# Patient Record
Sex: FEMALE | Race: BLACK OR AFRICAN AMERICAN | NOT HISPANIC OR LATINO
[De-identification: names, ages, dates, MRNs, and addresses within clinical notes are randomized per-mention and may not be internally consistent; named-entity substitution may affect disease eponyms.]

---

## 2017-01-17 ENCOUNTER — APPOINTMENT (OUTPATIENT)
Dept: OPHTHALMOLOGY | Facility: CLINIC | Age: 62
End: 2017-01-17

## 2017-03-17 ENCOUNTER — APPOINTMENT (OUTPATIENT)
Dept: OPHTHALMOLOGY | Facility: CLINIC | Age: 62
End: 2017-03-17

## 2017-06-06 ENCOUNTER — APPOINTMENT (OUTPATIENT)
Dept: OPHTHALMOLOGY | Facility: CLINIC | Age: 62
End: 2017-06-06

## 2017-06-06 DIAGNOSIS — H26.493 OTHER SECONDARY CATARACT, BILATERAL: ICD-10-CM

## 2017-08-18 ENCOUNTER — APPOINTMENT (OUTPATIENT)
Dept: OPHTHALMOLOGY | Facility: CLINIC | Age: 62
End: 2017-08-18
Payer: COMMERCIAL

## 2017-08-18 PROCEDURE — 92012 INTRM OPH EXAM EST PATIENT: CPT

## 2017-09-18 ENCOUNTER — APPOINTMENT (OUTPATIENT)
Dept: OPHTHALMOLOGY | Facility: CLINIC | Age: 62
End: 2017-09-18
Payer: COMMERCIAL

## 2017-09-18 DIAGNOSIS — H43.392 OTHER VITREOUS OPACITIES, LEFT EYE: ICD-10-CM

## 2017-09-18 DIAGNOSIS — H59.022: ICD-10-CM

## 2017-09-18 PROCEDURE — 92014 COMPRE OPH EXAM EST PT 1/>: CPT

## 2017-09-18 PROCEDURE — 92020 GONIOSCOPY: CPT

## 2017-09-18 PROCEDURE — 92134 CPTRZ OPH DX IMG PST SGM RTA: CPT

## 2017-12-04 ENCOUNTER — APPOINTMENT (OUTPATIENT)
Dept: OPHTHALMOLOGY | Facility: CLINIC | Age: 62
End: 2017-12-04
Payer: COMMERCIAL

## 2017-12-04 DIAGNOSIS — H20.022 RECURRENT ACUTE IRIDOCYCLITIS, LEFT EYE: ICD-10-CM

## 2017-12-04 PROCEDURE — 92012 INTRM OPH EXAM EST PATIENT: CPT

## 2018-12-11 ENCOUNTER — EMERGENCY (EMERGENCY)
Facility: HOSPITAL | Age: 63
LOS: 1 days | Discharge: HOME | End: 2018-12-11
Attending: EMERGENCY MEDICINE
Payer: COMMERCIAL

## 2018-12-11 VITALS
DIASTOLIC BLOOD PRESSURE: 85 MMHG | SYSTOLIC BLOOD PRESSURE: 159 MMHG | TEMPERATURE: 98 F | RESPIRATION RATE: 18 BRPM | OXYGEN SATURATION: 98 % | HEART RATE: 79 BPM | WEIGHT: 156.97 LBS

## 2018-12-11 VITALS
HEART RATE: 70 BPM | SYSTOLIC BLOOD PRESSURE: 144 MMHG | OXYGEN SATURATION: 99 % | DIASTOLIC BLOOD PRESSURE: 74 MMHG | RESPIRATION RATE: 16 BRPM

## 2018-12-11 DIAGNOSIS — Z98.89 OTHER SPECIFIED POSTPROCEDURAL STATES: Chronic | ICD-10-CM

## 2018-12-11 DIAGNOSIS — Z98.41 CATARACT EXTRACTION STATUS, RIGHT EYE: Chronic | ICD-10-CM

## 2018-12-11 DIAGNOSIS — Z98.42 CATARACT EXTRACTION STATUS, LEFT EYE: Chronic | ICD-10-CM

## 2018-12-11 PROCEDURE — 99283 EMERGENCY DEPT VISIT LOW MDM: CPT

## 2018-12-11 PROCEDURE — 99283 EMERGENCY DEPT VISIT LOW MDM: CPT | Mod: 25

## 2018-12-11 RX ORDER — IBUPROFEN 200 MG
600 TABLET ORAL ONCE
Qty: 0 | Refills: 0 | Status: COMPLETED | OUTPATIENT
Start: 2018-12-11 | End: 2018-12-11

## 2018-12-11 RX ORDER — ACETAMINOPHEN 500 MG
650 TABLET ORAL ONCE
Qty: 0 | Refills: 0 | Status: COMPLETED | OUTPATIENT
Start: 2018-12-11 | End: 2018-12-11

## 2018-12-11 RX ADMIN — Medication 600 MILLIGRAM(S): at 01:48

## 2018-12-11 RX ADMIN — Medication 650 MILLIGRAM(S): at 01:48

## 2018-12-11 NOTE — ED PROVIDER NOTE - OBJECTIVE STATEMENT
63F PMH migraine last many years ago p/w left sided scalp pain radiating into her neck which has been ongoing for 2 days. No known trauma to area, 63F PMH migraine last many years ago p/w left sided burning scalp pain radiating over to the right side of her scalp and into her neck which has been ongoing for 2 days. No known trauma to area. Denies fevers, chills, nausea, vomiting, chest pain, SOB. Pt has had migraines in the past but this is different from prior headaches.

## 2018-12-11 NOTE — ED PROVIDER NOTE - PMH
Cataract  both eyes, being monitored by ophthalmologist.  GERD (gastroesophageal reflux disease)    Pain  epigastric, current problem  Syncopal episodes  once in 1/2014, pt was evaluated by neuro and cardiac, negative finding, no recurence.  Vertigo

## 2018-12-11 NOTE — ED PROVIDER NOTE - NSFOLLOWUPINSTRUCTIONS_ED_ALL_ED_FT
Follow up with your primary care doctor within the next 48 hours.  Return to the Emergency Department if you have any fevers, nausea, vomiting, or any other concerning symptoms.  Take tylenol 650mg every 8 hours and ibuprofen 600mg every 6 hours as needed for pain.

## 2018-12-11 NOTE — ED PROVIDER NOTE - FAMILY HISTORY
Mother  Still living? Unknown  Family history of diabetes mellitus type II, Age at diagnosis: Age Unknown

## 2018-12-11 NOTE — ED ADULT TRIAGE NOTE - CHIEF COMPLAINT QUOTE
Patient c/o pain (burning sensation) on the back of her head mostly towards the left side. Symptoms started 3 days ago. Patient also c/o nausea.

## 2018-12-11 NOTE — ED PROVIDER NOTE - PSH
Cataract extraction status of eye, right    S/P cataract surgery, left    S/P  section  in   Status post arthroscopic surgery of left knee

## 2018-12-11 NOTE — ED PROVIDER NOTE - NSFOLLOWUPCLINICS_GEN_ALL_ED_FT
[facility]  Neurology  [address] [place] [address] [address] [place] [address]  Phone: [phone]  Fax:   Follow Up Time:

## 2018-12-11 NOTE — ED PROVIDER NOTE - ATTENDING CONTRIBUTION TO CARE
Patient with left sided burning scalp pain without rash. Mild to moderate pain. Worse with touching and movement. No n/v/f/c/cp/sob. Pain radiating to the neck. Patient with history of migraines but burning sensation is different from typical headaches  no rash on exam, no lesions to scalp  cooperative  CN 2-12 intact, normal coordination, normal finger to nose, normal heel to shin, negative Romberg, no pronator drift, no gait instability, 5/5 strength in upper and lower extremities, normal sensory throughout, alert and oriented to person, place, time, and situation.  pleasant  non-tachycardic, non-tachypneic   well appearing  trachea midline  mmm  no midline cervical tenderness to palpation, neck is supple  concern for atypical migraine with tension like component into the neck  will get analgesia reassure, and have patient  follow up with neurology as outpatient.  The patient was re-examined after interventions and is feeling much better.  The patient will follow up with their primary physician this week.  No immediate life threatening issues present on history or clinical exam. Patient is a safe disposition home, has capacity and insight into their condition, is ambulatory in the Emergency Department with no further questions and will follow up with their doctor(s) this week. Patient and family  understand anticipatory guidance were given strict return and follow up precautions.  The patient and family have been informed of all concerning signs and symptoms to return to Emergency Department, the necessity to follow up with the PMD/Clinic/follow up provided within 2-3 days was explained, and the patient and/or family reports understanding of above with capacity and insight.

## 2018-12-11 NOTE — ED ADULT NURSE NOTE - OBJECTIVE STATEMENT
Pt is a 63YOF hx of vertigo (supposed to take Meclizine 12.5mg daily, last took 3 days ago) received ambulatory A&Ox4 complaining of "burning left sided head pain" x3 days. 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 Pt states the pain starts on her left occipital lobe and travels midline and down her neck. Pt also complains of nausea, no vomiting. No HA, dizziness, chest pain, SOB, fever, chills. neuro intact. Face is symmetrical. PERRL 3mmB. Speech is clear. Patient is moving all extremities with 5/5 strength and walks with steady gait. Took 5oomg x2 Tylenol with some relief today at 2pm.

## 2018-12-27 ENCOUNTER — APPOINTMENT (OUTPATIENT)
Dept: OPHTHALMOLOGY | Facility: CLINIC | Age: 63
End: 2018-12-27

## 2021-08-15 ENCOUNTER — INPATIENT (INPATIENT)
Facility: HOSPITAL | Age: 66
LOS: 4 days | Discharge: HOME | DRG: 177 | End: 2021-08-20
Attending: INTERNAL MEDICINE | Admitting: STUDENT IN AN ORGANIZED HEALTH CARE EDUCATION/TRAINING PROGRAM
Payer: MEDICARE

## 2021-08-15 VITALS
DIASTOLIC BLOOD PRESSURE: 70 MMHG | OXYGEN SATURATION: 88 % | WEIGHT: 153 LBS | HEIGHT: 63 IN | HEART RATE: 104 BPM | SYSTOLIC BLOOD PRESSURE: 104 MMHG | RESPIRATION RATE: 18 BRPM | TEMPERATURE: 101 F

## 2021-08-15 DIAGNOSIS — Z98.41 CATARACT EXTRACTION STATUS, RIGHT EYE: Chronic | ICD-10-CM

## 2021-08-15 DIAGNOSIS — Z98.89 OTHER SPECIFIED POSTPROCEDURAL STATES: Chronic | ICD-10-CM

## 2021-08-15 DIAGNOSIS — Z98.42 CATARACT EXTRACTION STATUS, LEFT EYE: Chronic | ICD-10-CM

## 2021-08-16 DIAGNOSIS — R42 DIZZINESS AND GIDDINESS: ICD-10-CM

## 2021-08-16 LAB
ALBUMIN SERPL ELPH-MCNC: 3.8 G/DL — SIGNIFICANT CHANGE UP (ref 3.3–5)
ALBUMIN SERPL ELPH-MCNC: 3.9 G/DL — SIGNIFICANT CHANGE UP (ref 3.3–5)
ALP SERPL-CCNC: 59 U/L — SIGNIFICANT CHANGE UP (ref 40–120)
ALP SERPL-CCNC: 60 U/L — SIGNIFICANT CHANGE UP (ref 40–120)
ALT FLD-CCNC: 29 U/L — SIGNIFICANT CHANGE UP (ref 10–45)
ALT FLD-CCNC: 29 U/L — SIGNIFICANT CHANGE UP (ref 10–45)
ANION GAP SERPL CALC-SCNC: 10 MMOL/L — SIGNIFICANT CHANGE UP (ref 5–17)
AST SERPL-CCNC: 49 U/L — HIGH (ref 10–40)
AST SERPL-CCNC: 57 U/L — HIGH (ref 10–40)
BASOPHILS # BLD AUTO: 0 K/UL — SIGNIFICANT CHANGE UP (ref 0–0.2)
BASOPHILS NFR BLD AUTO: 0 % — SIGNIFICANT CHANGE UP (ref 0–2)
BILIRUB DIRECT SERPL-MCNC: 0.2 MG/DL — SIGNIFICANT CHANGE UP (ref 0–0.2)
BILIRUB INDIRECT FLD-MCNC: 0.3 MG/DL — SIGNIFICANT CHANGE UP (ref 0.2–1)
BILIRUB SERPL-MCNC: 0.5 MG/DL — SIGNIFICANT CHANGE UP (ref 0.2–1.2)
BILIRUB SERPL-MCNC: 0.5 MG/DL — SIGNIFICANT CHANGE UP (ref 0.2–1.2)
BUN SERPL-MCNC: 8 MG/DL — SIGNIFICANT CHANGE UP (ref 7–23)
CALCIUM SERPL-MCNC: 9.1 MG/DL — SIGNIFICANT CHANGE UP (ref 8.4–10.5)
CHLORIDE SERPL-SCNC: 96 MMOL/L — SIGNIFICANT CHANGE UP (ref 96–108)
CO2 SERPL-SCNC: 27 MMOL/L — SIGNIFICANT CHANGE UP (ref 22–31)
CREAT SERPL-MCNC: 0.94 MG/DL — SIGNIFICANT CHANGE UP (ref 0.5–1.3)
CREAT SERPL-MCNC: 1.15 MG/DL — SIGNIFICANT CHANGE UP (ref 0.5–1.3)
CRP SERPL-MCNC: 18 MG/L — HIGH (ref 0–4)
D DIMER BLD IA.RAPID-MCNC: 592 NG/ML DDU — HIGH
EOSINOPHIL # BLD AUTO: 0 K/UL — SIGNIFICANT CHANGE UP (ref 0–0.5)
EOSINOPHIL NFR BLD AUTO: 0 % — SIGNIFICANT CHANGE UP (ref 0–6)
FERRITIN SERPL-MCNC: 2319 NG/ML — HIGH (ref 15–150)
GLUCOSE SERPL-MCNC: 105 MG/DL — HIGH (ref 70–99)
HCT VFR BLD CALC: 42.9 % — SIGNIFICANT CHANGE UP (ref 34.5–45)
HGB BLD-MCNC: 13.8 G/DL — SIGNIFICANT CHANGE UP (ref 11.5–15.5)
INR BLD: 0.94 RATIO — SIGNIFICANT CHANGE UP (ref 0.88–1.16)
LACTATE BLDV-MCNC: 1.5 MMOL/L — SIGNIFICANT CHANGE UP (ref 0.7–2)
LYMPHOCYTES # BLD AUTO: 0.85 K/UL — LOW (ref 1–3.3)
LYMPHOCYTES # BLD AUTO: 18.4 % — SIGNIFICANT CHANGE UP (ref 13–44)
MCHC RBC-ENTMCNC: 27.5 PG — SIGNIFICANT CHANGE UP (ref 27–34)
MCHC RBC-ENTMCNC: 32.2 GM/DL — SIGNIFICANT CHANGE UP (ref 32–36)
MCV RBC AUTO: 85.6 FL — SIGNIFICANT CHANGE UP (ref 80–100)
MONOCYTES # BLD AUTO: 0.61 K/UL — SIGNIFICANT CHANGE UP (ref 0–0.9)
MONOCYTES NFR BLD AUTO: 13.2 % — SIGNIFICANT CHANGE UP (ref 2–14)
NEUTROPHILS # BLD AUTO: 2.99 K/UL — SIGNIFICANT CHANGE UP (ref 1.8–7.4)
NEUTROPHILS NFR BLD AUTO: 56.1 % — SIGNIFICANT CHANGE UP (ref 43–77)
PLATELET # BLD AUTO: 134 K/UL — LOW (ref 150–400)
POTASSIUM SERPL-MCNC: 4.4 MMOL/L — SIGNIFICANT CHANGE UP (ref 3.5–5.3)
POTASSIUM SERPL-SCNC: 4.4 MMOL/L — SIGNIFICANT CHANGE UP (ref 3.5–5.3)
PROCALCITONIN SERPL-MCNC: 0.08 NG/ML — SIGNIFICANT CHANGE UP (ref 0.02–0.1)
PROT SERPL-MCNC: 7.3 G/DL — SIGNIFICANT CHANGE UP (ref 6–8.3)
PROT SERPL-MCNC: 7.4 G/DL — SIGNIFICANT CHANGE UP (ref 6–8.3)
PROTHROM AB SERPL-ACNC: 11.3 SEC — SIGNIFICANT CHANGE UP (ref 10.6–13.6)
RAPID RVP RESULT: DETECTED
RBC # BLD: 5.01 M/UL — SIGNIFICANT CHANGE UP (ref 3.8–5.2)
RBC # FLD: 12.3 % — SIGNIFICANT CHANGE UP (ref 10.3–14.5)
SARS-COV-2 RNA SPEC QL NAA+PROBE: DETECTED
SODIUM SERPL-SCNC: 133 MMOL/L — LOW (ref 135–145)
WBC # BLD: 4.6 K/UL — SIGNIFICANT CHANGE UP (ref 3.8–10.5)
WBC # FLD AUTO: 4.6 K/UL — SIGNIFICANT CHANGE UP (ref 3.8–10.5)

## 2021-08-16 PROCEDURE — 93010 ELECTROCARDIOGRAM REPORT: CPT

## 2021-08-16 PROCEDURE — 99222 1ST HOSP IP/OBS MODERATE 55: CPT

## 2021-08-16 PROCEDURE — 71045 X-RAY EXAM CHEST 1 VIEW: CPT | Mod: 26

## 2021-08-16 RX ORDER — REMDESIVIR 5 MG/ML
INJECTION INTRAVENOUS
Refills: 0 | Status: COMPLETED | OUTPATIENT
Start: 2021-08-16 | End: 2021-08-20

## 2021-08-16 RX ORDER — IBUPROFEN 200 MG
400 TABLET ORAL ONCE
Refills: 0 | Status: COMPLETED | OUTPATIENT
Start: 2021-08-16 | End: 2021-08-16

## 2021-08-16 RX ORDER — DEXAMETHASONE 0.5 MG/5ML
6 ELIXIR ORAL ONCE
Refills: 0 | Status: COMPLETED | OUTPATIENT
Start: 2021-08-16 | End: 2021-08-16

## 2021-08-16 RX ORDER — IBUPROFEN 200 MG
400 TABLET ORAL ONCE
Refills: 0 | Status: COMPLETED | OUTPATIENT
Start: 2021-08-16 | End: 2021-08-17

## 2021-08-16 RX ORDER — DEXAMETHASONE 0.5 MG/5ML
6 ELIXIR ORAL DAILY
Refills: 0 | Status: DISCONTINUED | OUTPATIENT
Start: 2021-08-16 | End: 2021-08-20

## 2021-08-16 RX ORDER — REMDESIVIR 5 MG/ML
100 INJECTION INTRAVENOUS EVERY 24 HOURS
Refills: 0 | Status: COMPLETED | OUTPATIENT
Start: 2021-08-17 | End: 2021-08-20

## 2021-08-16 RX ORDER — REMDESIVIR 5 MG/ML
200 INJECTION INTRAVENOUS EVERY 24 HOURS
Refills: 0 | Status: COMPLETED | OUTPATIENT
Start: 2021-08-16 | End: 2021-08-16

## 2021-08-16 RX ORDER — ENOXAPARIN SODIUM 100 MG/ML
40 INJECTION SUBCUTANEOUS DAILY
Refills: 0 | Status: DISCONTINUED | OUTPATIENT
Start: 2021-08-16 | End: 2021-08-20

## 2021-08-16 RX ADMIN — Medication 6 MILLIGRAM(S): at 01:09

## 2021-08-16 RX ADMIN — REMDESIVIR 500 MILLIGRAM(S): 5 INJECTION INTRAVENOUS at 17:45

## 2021-08-16 RX ADMIN — Medication 6 MILLIGRAM(S): at 23:35

## 2021-08-16 RX ADMIN — Medication 400 MILLIGRAM(S): at 01:08

## 2021-08-16 RX ADMIN — ENOXAPARIN SODIUM 40 MILLIGRAM(S): 100 INJECTION SUBCUTANEOUS at 12:10

## 2021-08-16 RX ADMIN — Medication 400 MILLIGRAM(S): at 01:38

## 2021-08-16 NOTE — ED PROVIDER NOTE - ATTENDING CONTRIBUTION TO CARE
Attending MD Gunter:  I personally have seen and examined this patient.  Resident note reviewed and agree on plan of care and except where noted.  See HPI, PE, and MDM for details.

## 2021-08-16 NOTE — PROVIDER CONTACT NOTE (OTHER) - ACTION/TREATMENT ORDERED:
As per ACP: obtain updated vitals and EKG, patient with similar complaint during admission, analgesics administered at that time. Pending orders. Will continue to monitor.

## 2021-08-16 NOTE — ED PROVIDER NOTE - NS ED ROS FT
CONSTITUTIONAL: No fevers, no chills, + dizziness  EYES: no visual changes, no eye pain  EARS: no ear drainage, no ear pain, no change in hearing  NOSE: no nasal congestion  MOUTH/THROAT: no sore throat  CV: No chest pain, no palpitations  RESP: No SOB, no cough  GI: +nausea, +vomiting, no diarrhea, no abd pain  : no dysuria, no hematuria, no flank pain  MSK: no back pain, no extremity pain  SKIN: no rashes  NEURO: no headache, no focal weakness, no decreased sensation/paresthesias   PSYCHIATRIC: no known mental health issues

## 2021-08-16 NOTE — ED PROVIDER NOTE - NSICDXPASTSURGICALHX_GEN_ALL_CORE_FT
PAST SURGICAL HISTORY:  Cataract extraction status of eye, right     S/P cataract surgery, left     S/P  section in     Status post arthroscopic surgery of left knee 2007

## 2021-08-16 NOTE — CONSULT NOTE ADULT - SUBJECTIVE AND OBJECTIVE BOX
HPI:  65y F w/ PMHx Vertigo on Meclizine, GERD presenting with nausea, dizziness, weakness and vomiting that started two weeks ago. Patient states her dizziness is typical of her vertigo, but has been worsening in nature and recurring more frequently in nature w/ associated nausea, vomiting and weakness. Denies headache, visual changes, focal weakness/numbness, chest pain or shortness of breath. Patient states she is not vaccinated for COVID. Patient arrived to room hypoxic to 88% on RA, but does not endorse difficulty breathing or cough. Denies recent travel. She lives with her  who is well, he is fully vaccinated. She is not vaccinated. She thinks she might have contracted COVID while going to the gym.    PAST MEDICAL & SURGICAL HISTORY:  Vertigo    GERD (gastroesophageal reflux disease)    Pain  epigastric, current problem    Cataract  both eyes, being monitored by ophthalmologist.    Syncopal episodes  once in 2014, pt was evaluated by neuro and cardiac, negative finding, no recurence.    S/P  section  in     Status post arthroscopic surgery of left knee      Cataract extraction status of eye, right    S/P cataract surgery, left    Allergies    multivatimin b complex (Short breath)  multivitamin (Unknown)    Intolerances    ANTIMICROBIALS:  remdesivir  IVPB    remdesivir  IVPB 200 every 24 hours    OTHER MEDS:  dexAMETHasone  Injectable 6 milliGRAM(s) IV Push daily  enoxaparin Injectable 40 milliGRAM(s) SubCutaneous daily    SOCIAL HISTORY: Denies smoking, alcohol, drug use.    FAMILY HISTORY:  Family history of diabetes mellitus type II (Mother) - mother lived to 99 years and 10 months old    Drug Dosing Weight  Height (cm): 160 (15 Aug 2021 22:39)  Weight (kg): 69.4 (15 Aug 2021 22:39)  BMI (kg/m2): 27.1 (15 Aug 2021 22:39)  BSA (m2): 1.73 (15 Aug 2021 22:39)    PE:    Vital Signs Last 24 Hrs  T(C): 36.8 (16 Aug 2021 11:27), Max: 38.3 (15 Aug 2021 22:39)  T(F): 98.3 (16 Aug 2021 11:27), Max: 100.9 (15 Aug 2021 22:39)  HR: 92 (16 Aug 2021 11:27) (80 - 104)  BP: 108/73 (16 Aug 2021 11:27) (102/68 - 119/74)  BP(mean): 83 (16 Aug 2021 01:15) (83 - 83)  RR: 18 (16 Aug 2021 11:27) (18 - 20)  SpO2: 96% (16 Aug 2021 11:27) (88% - 99%)    Gen: AOx3, NAD  CV: S1+S2 normal, no murmurs  Resp: Crackles in right base  Abd: Soft, nontender, +BS  Ext: No LE edema, no wounds  : No Mallory  IV/Skin: No thrombophlebitis  Msk: No low back pain, no arthralgias, no joint swelling  Neuro: No sensory deficits, no motor deficits    LABS:                          13.8   4.60  )-----------( 134      ( 16 Aug 2021 00:03 )             42.9           x   |  x   |  x   ----------------------------<  x   x    |  x   |  0.94    Ca    9.1      16 Aug 2021 00:03    TPro  7.4  /  Alb  3.9  /  TBili  0.5  /  DBili  0.2  /  AST  49<H>  /  ALT  29  /  AlkPhos  60      MICROBIOLOGY:  v    Rapid RVP Result: Detected ( @ 00:03)    RADIOLOGY:  < from: Xray Chest 1 View- PORTABLE-Urgent (21 @ 00:27) >  IMPRESSION:  Bilateral lower lobe and peripherally predominant patchy opacities, compatible with infectious  etiology.    < end of copied text >

## 2021-08-16 NOTE — ED ADULT NURSE NOTE - OBJECTIVE STATEMENT
65y F w/ PMHx Vertigo on Meclizine, GERD presenting with nausea, dizziness, weakness and vomiting x5 days. Patient states her dizziness is typical of her vertigo, but has been worsening in nature and recurring more frequently. Pt is AOx4, patent airway, soft non tender abdomen, strong peripheral pulses, skin is warm dry and intact, no changes in bowel/bladder patterns, ambulates independently. Patient denies fever, chills, abd pain, diarrhea/constipation, numbness/tingling, urinary s/s,  no chest pain, Patient safety provided with call bell within reach and bed in the lowest position.

## 2021-08-16 NOTE — ED PROVIDER NOTE - PROGRESS NOTE DETAILS
Cristiane BURNHAM (PGY-3): Upon reassessment, patient remains on 3L NC and asymptomatic. Dizziness worsens with standing. Patient asymptomatic on stretcher.

## 2021-08-16 NOTE — ED PROVIDER NOTE - PHYSICAL EXAMINATION
Physical Exam:  Gen: NAD, AOx3, non-toxic appearing, able to ambulate without assistance  HEENT: EOMI, PEERL, normal conjunctiva, tongue midline, oral mucosa moist  Lung: CTAB, no respiratory distress, no wheezes/rhonchi/rales B/L, speaking in full sentences  CV: RRR, no murmurs, rubs or gallops  Abd: soft, NT, ND, no guarding, no rigidity, no rebound tenderness, no CVA tenderness   MSK: no visible deformities, ROM normal in UE/LE, no back pain  Neuro: CN2-12 grossly intact, MS +5/5 in UE and LE BL, finger to nose smooth and rapid, gross sensation intact in UE and LE BL, negative rhomberg, negative pronator drift  Skin: Warm, well perfused, no rash, no leg swelling  Psych: normal affect, calm  Ksenia Sauer D.O. -Resident

## 2021-08-16 NOTE — H&P ADULT - RS GEN PE MLT RESP DETAILS PC
breath sounds equal/clear to auscultation bilaterally/diminished breath sounds, L/diminished breath sounds, R/rales

## 2021-08-16 NOTE — CONSULT NOTE ADULT - ASSESSMENT
65 year old female with vertigo on meclizine, GERD presenting with nausea, dizziness, weakness, and vomiting that started about two weeks ago, patient states with worsening symptoms so came to the ED. Febrile in the .9F. WBC WNL. Ferritin 2319. PCT 0.08. COVID PCR + 8/16/2021. CXR with bilateral patchy opacities.    #COVID PNA with hypoxia  -Continue supportive care  -Continue dexamethasone and Remdesivir per hospital protocol  -Wean off supplemental O2 as tolerated  -Trend inflammatory markers  -Trend d-dimer  -Trend CrCl and LFTs daily while on Remdesivir  -Low PCT points away from a bacterial process, can monitor off abx.    Simon Jones MD  Pager [phone]  After 5pm/weekends call [phone]

## 2021-08-16 NOTE — ED PROVIDER NOTE - CLINICAL SUMMARY MEDICAL DECISION MAKING FREE TEXT BOX
Attending MD Gunter:  65F presenting with fatigue, acute on chronic dizziness (ho chronic vertigo), cough. Triage vitals notable for RA sat 88%, no distress. Non-focal neuro exam. Unvaccinated for covid-19. High suspicion for covid-19 illness, will obtain CXR, covid RVP. Vertigo chronic issue with non-focal neuro exam, no need for urgent diagnostics for this.       *The above represents an initial assessment/impression. Please refer to progress notes for potential changes in patient clinical course*

## 2021-08-16 NOTE — PROVIDER CONTACT NOTE (OTHER) - ASSESSMENT
Patient resting in bed, states that her chest feels as if its trying to jump out of her chest. She endorses non-radiating chest tightness as well.     VS to be obtained.

## 2021-08-16 NOTE — H&P ADULT - HISTORY OF PRESENT ILLNESS
65y F with pmhx vertigo on Meclizine, GERD presenting with nausea, dizziness, weakness and vomiting that started two weeks ago. Patient states her dizziness is typical of her vertigo, associated with nausea, vomiting and weakness.   Patient states she is not vaccinated for COVID.   In the ed patient tested COVID positive.

## 2021-08-16 NOTE — ED PROVIDER NOTE - NSICDXPASTMEDICALHX_GEN_ALL_CORE_FT
PAST MEDICAL HISTORY:  Cataract both eyes, being monitored by ophthalmologist.    GERD (gastroesophageal reflux disease)     Pain epigastric, current problem    Syncopal episodes once in 1/2014, pt was evaluated by neuro and cardiac, negative finding, no recurence.    Vertigo

## 2021-08-16 NOTE — ED PROVIDER NOTE - OBJECTIVE STATEMENT
65y F w/ PMHx Vertigo on Meclizine, GERD presenting with nausea, dizziness, weakness and vomiting x5 days. Patient states her dizziness is typical of her vertigo, but has been worsening in nature and recurring more frequently 65y F w/ PMHx Vertigo on Meclizine, GERD presenting with nausea, dizziness, weakness and vomiting x5 days. Patient states her dizziness is typical of her vertigo, but has been worsening in nature and recurring more frequently in nature w/ associated nausea, vomiting and weakness. Patient denies change in quality of her dizzy symptoms and denies associated headache, visual changes, focal weakness/numbness, chest pain or shortness of breath. Patient states she is not vaccinated for COVID. Patient arrived to room hypoxic to 88% on RA, but does not endorse difficulty breathing or cough. Denies recent travel.

## 2021-08-16 NOTE — ED PROVIDER NOTE - NSICDXFAMILYHX_GEN_ALL_CORE_FT
FAMILY HISTORY:  Mother  Still living? Unknown  Family history of diabetes mellitus type II, Age at diagnosis: Age Unknown

## 2021-08-17 LAB
ALBUMIN SERPL ELPH-MCNC: 3.8 G/DL — SIGNIFICANT CHANGE UP (ref 3.3–5)
ALBUMIN SERPL ELPH-MCNC: 4.4 G/DL — SIGNIFICANT CHANGE UP (ref 3.3–5)
ALP SERPL-CCNC: 59 U/L — SIGNIFICANT CHANGE UP (ref 40–120)
ALP SERPL-CCNC: 62 U/L — SIGNIFICANT CHANGE UP (ref 40–120)
ALT FLD-CCNC: 35 U/L — SIGNIFICANT CHANGE UP (ref 10–45)
ALT FLD-CCNC: 39 U/L — SIGNIFICANT CHANGE UP (ref 10–45)
ANION GAP SERPL CALC-SCNC: 14 MMOL/L — SIGNIFICANT CHANGE UP (ref 5–17)
AST SERPL-CCNC: 49 U/L — HIGH (ref 10–40)
AST SERPL-CCNC: 50 U/L — HIGH (ref 10–40)
BASOPHILS # BLD AUTO: 0 K/UL — SIGNIFICANT CHANGE UP (ref 0–0.2)
BASOPHILS NFR BLD AUTO: 0 % — SIGNIFICANT CHANGE UP (ref 0–2)
BILIRUB DIRECT SERPL-MCNC: 0.1 MG/DL — SIGNIFICANT CHANGE UP (ref 0–0.2)
BILIRUB INDIRECT FLD-MCNC: 0.4 MG/DL — SIGNIFICANT CHANGE UP (ref 0.2–1)
BILIRUB SERPL-MCNC: 0.5 MG/DL — SIGNIFICANT CHANGE UP (ref 0.2–1.2)
BILIRUB SERPL-MCNC: 0.5 MG/DL — SIGNIFICANT CHANGE UP (ref 0.2–1.2)
BUN SERPL-MCNC: 13 MG/DL — SIGNIFICANT CHANGE UP (ref 7–23)
CALCIUM SERPL-MCNC: 9.5 MG/DL — SIGNIFICANT CHANGE UP (ref 8.4–10.5)
CHLORIDE SERPL-SCNC: 102 MMOL/L — SIGNIFICANT CHANGE UP (ref 96–108)
CO2 SERPL-SCNC: 20 MMOL/L — LOW (ref 22–31)
COVID-19 SPIKE DOMAIN AB INTERP: POSITIVE
COVID-19 SPIKE DOMAIN ANTIBODY RESULT: 1.69 U/ML — HIGH
CREAT SERPL-MCNC: 0.98 MG/DL — SIGNIFICANT CHANGE UP (ref 0.5–1.3)
CREAT SERPL-MCNC: 1.01 MG/DL — SIGNIFICANT CHANGE UP (ref 0.5–1.3)
EOSINOPHIL # BLD AUTO: 0 K/UL — SIGNIFICANT CHANGE UP (ref 0–0.5)
EOSINOPHIL NFR BLD AUTO: 0 % — SIGNIFICANT CHANGE UP (ref 0–6)
GLUCOSE SERPL-MCNC: 135 MG/DL — HIGH (ref 70–99)
HCT VFR BLD CALC: 44.2 % — SIGNIFICANT CHANGE UP (ref 34.5–45)
HGB BLD-MCNC: 14.2 G/DL — SIGNIFICANT CHANGE UP (ref 11.5–15.5)
INR BLD: 0.97 RATIO — SIGNIFICANT CHANGE UP (ref 0.88–1.16)
LYMPHOCYTES # BLD AUTO: 1.04 K/UL — SIGNIFICANT CHANGE UP (ref 1–3.3)
LYMPHOCYTES # BLD AUTO: 14.3 % — SIGNIFICANT CHANGE UP (ref 13–44)
MANUAL SMEAR VERIFICATION: SIGNIFICANT CHANGE UP
MCHC RBC-ENTMCNC: 27 PG — SIGNIFICANT CHANGE UP (ref 27–34)
MCHC RBC-ENTMCNC: 32.1 GM/DL — SIGNIFICANT CHANGE UP (ref 32–36)
MCV RBC AUTO: 84.2 FL — SIGNIFICANT CHANGE UP (ref 80–100)
MONOCYTES # BLD AUTO: 0.45 K/UL — SIGNIFICANT CHANGE UP (ref 0–0.9)
MONOCYTES NFR BLD AUTO: 6.2 % — SIGNIFICANT CHANGE UP (ref 2–14)
NEUTROPHILS # BLD AUTO: 5.44 K/UL — SIGNIFICANT CHANGE UP (ref 1.8–7.4)
NEUTROPHILS NFR BLD AUTO: 74.1 % — SIGNIFICANT CHANGE UP (ref 43–77)
NEUTS BAND # BLD: 0.9 % — SIGNIFICANT CHANGE UP (ref 0–8)
PLAT MORPH BLD: NORMAL — SIGNIFICANT CHANGE UP
PLATELET # BLD AUTO: 220 K/UL — SIGNIFICANT CHANGE UP (ref 150–400)
POTASSIUM SERPL-MCNC: 5.4 MMOL/L — HIGH (ref 3.5–5.3)
POTASSIUM SERPL-SCNC: 5.4 MMOL/L — HIGH (ref 3.5–5.3)
PROT SERPL-MCNC: 7.4 G/DL — SIGNIFICANT CHANGE UP (ref 6–8.3)
PROT SERPL-MCNC: 7.9 G/DL — SIGNIFICANT CHANGE UP (ref 6–8.3)
PROTHROM AB SERPL-ACNC: 11.7 SEC — SIGNIFICANT CHANGE UP (ref 10.6–13.6)
RBC # BLD: 5.25 M/UL — HIGH (ref 3.8–5.2)
RBC # FLD: 12.3 % — SIGNIFICANT CHANGE UP (ref 10.3–14.5)
RBC BLD AUTO: SIGNIFICANT CHANGE UP
SARS-COV-2 IGG+IGM SERPL QL IA: 1.69 U/ML — HIGH
SARS-COV-2 IGG+IGM SERPL QL IA: POSITIVE
SODIUM SERPL-SCNC: 136 MMOL/L — SIGNIFICANT CHANGE UP (ref 135–145)
VARIANT LYMPHS # BLD: 4.5 % — SIGNIFICANT CHANGE UP (ref 0–6)
WBC # BLD: 7.25 K/UL — SIGNIFICANT CHANGE UP (ref 3.8–10.5)
WBC # FLD AUTO: 7.25 K/UL — SIGNIFICANT CHANGE UP (ref 3.8–10.5)

## 2021-08-17 PROCEDURE — 99232 SBSQ HOSP IP/OBS MODERATE 35: CPT

## 2021-08-17 RX ORDER — CHLORHEXIDINE GLUCONATE 213 G/1000ML
1 SOLUTION TOPICAL
Refills: 0 | Status: DISCONTINUED | OUTPATIENT
Start: 2021-08-17 | End: 2021-08-20

## 2021-08-17 RX ADMIN — CHLORHEXIDINE GLUCONATE 1 APPLICATION(S): 213 SOLUTION TOPICAL at 14:00

## 2021-08-17 RX ADMIN — Medication 400 MILLIGRAM(S): at 00:22

## 2021-08-17 RX ADMIN — ENOXAPARIN SODIUM 40 MILLIGRAM(S): 100 INJECTION SUBCUTANEOUS at 11:48

## 2021-08-17 RX ADMIN — REMDESIVIR 500 MILLIGRAM(S): 5 INJECTION INTRAVENOUS at 17:21

## 2021-08-17 RX ADMIN — Medication 400 MILLIGRAM(S): at 01:44

## 2021-08-17 RX ADMIN — Medication 6 MILLIGRAM(S): at 21:30

## 2021-08-18 ENCOUNTER — TRANSCRIPTION ENCOUNTER (OUTPATIENT)
Age: 66
End: 2021-08-18

## 2021-08-18 LAB
ALBUMIN SERPL ELPH-MCNC: 3.8 G/DL — SIGNIFICANT CHANGE UP (ref 3.3–5)
ALP SERPL-CCNC: 55 U/L — SIGNIFICANT CHANGE UP (ref 40–120)
ALT FLD-CCNC: 33 U/L — SIGNIFICANT CHANGE UP (ref 10–45)
ANION GAP SERPL CALC-SCNC: 13 MMOL/L — SIGNIFICANT CHANGE UP (ref 5–17)
AST SERPL-CCNC: 39 U/L — SIGNIFICANT CHANGE UP (ref 10–40)
BILIRUB SERPL-MCNC: 0.4 MG/DL — SIGNIFICANT CHANGE UP (ref 0.2–1.2)
BUN SERPL-MCNC: 11 MG/DL — SIGNIFICANT CHANGE UP (ref 7–23)
CALCIUM SERPL-MCNC: 9.5 MG/DL — SIGNIFICANT CHANGE UP (ref 8.4–10.5)
CHLORIDE SERPL-SCNC: 100 MMOL/L — SIGNIFICANT CHANGE UP (ref 96–108)
CO2 SERPL-SCNC: 24 MMOL/L — SIGNIFICANT CHANGE UP (ref 22–31)
CREAT SERPL-MCNC: 0.86 MG/DL — SIGNIFICANT CHANGE UP (ref 0.5–1.3)
CRP SERPL-MCNC: 6 MG/L — HIGH (ref 0–4)
D DIMER BLD IA.RAPID-MCNC: 645 NG/ML DDU — HIGH
FERRITIN SERPL-MCNC: 1343 NG/ML — HIGH (ref 15–150)
GLUCOSE SERPL-MCNC: 133 MG/DL — HIGH (ref 70–99)
HCT VFR BLD CALC: 41.9 % — SIGNIFICANT CHANGE UP (ref 34.5–45)
HGB BLD-MCNC: 13.6 G/DL — SIGNIFICANT CHANGE UP (ref 11.5–15.5)
MCHC RBC-ENTMCNC: 27.8 PG — SIGNIFICANT CHANGE UP (ref 27–34)
MCHC RBC-ENTMCNC: 32.5 GM/DL — SIGNIFICANT CHANGE UP (ref 32–36)
MCV RBC AUTO: 85.5 FL — SIGNIFICANT CHANGE UP (ref 80–100)
NRBC # BLD: 0 /100 WBCS — SIGNIFICANT CHANGE UP (ref 0–0)
PLATELET # BLD AUTO: 242 K/UL — SIGNIFICANT CHANGE UP (ref 150–400)
POTASSIUM SERPL-MCNC: 5.3 MMOL/L — SIGNIFICANT CHANGE UP (ref 3.5–5.3)
POTASSIUM SERPL-SCNC: 5.3 MMOL/L — SIGNIFICANT CHANGE UP (ref 3.5–5.3)
PROT SERPL-MCNC: 7.1 G/DL — SIGNIFICANT CHANGE UP (ref 6–8.3)
RBC # BLD: 4.9 M/UL — SIGNIFICANT CHANGE UP (ref 3.8–5.2)
RBC # FLD: 12.4 % — SIGNIFICANT CHANGE UP (ref 10.3–14.5)
SODIUM SERPL-SCNC: 137 MMOL/L — SIGNIFICANT CHANGE UP (ref 135–145)
WBC # BLD: 7.85 K/UL — SIGNIFICANT CHANGE UP (ref 3.8–10.5)
WBC # FLD AUTO: 7.85 K/UL — SIGNIFICANT CHANGE UP (ref 3.8–10.5)

## 2021-08-18 PROCEDURE — 99232 SBSQ HOSP IP/OBS MODERATE 35: CPT

## 2021-08-18 RX ADMIN — CHLORHEXIDINE GLUCONATE 1 APPLICATION(S): 213 SOLUTION TOPICAL at 05:08

## 2021-08-18 RX ADMIN — REMDESIVIR 500 MILLIGRAM(S): 5 INJECTION INTRAVENOUS at 17:29

## 2021-08-18 RX ADMIN — Medication 6 MILLIGRAM(S): at 22:02

## 2021-08-18 RX ADMIN — ENOXAPARIN SODIUM 40 MILLIGRAM(S): 100 INJECTION SUBCUTANEOUS at 12:04

## 2021-08-18 NOTE — DISCHARGE NOTE PROVIDER - NSDCMRMEDTOKEN_GEN_ALL_CORE_FT
meclizine:  orally   Percocet 5/325 oral tablet: 1 tab(s) orally every 4 hours, As needed, Moderate Pain  ranitidine 150 mg oral tablet: 1 tab(s) orally 2 times a day   meclizine:  orally   Oxygen: 2 liters nasal cannula as needed  Dx: UO7.1  Inogen setting 2  Duration: 12 months   Percocet 5/325 oral tablet: 1 tab(s) orally every 4 hours, As needed, Moderate Pain  ranitidine 150 mg oral tablet: 1 tab(s) orally 2 times a day   dexamethasone 6 mg oral tablet: 1 tab(s) orally once a day Total 10 days ( last day 8/25)   meclizine:  orally   Oxygen: 2 liters nasal cannula as needed  Dx: UO7.1  Inogen setting 2  Duration: 12 months   ranitidine 150 mg oral tablet: 1 tab(s) orally 2 times a day  sodium chloride 0.65% nasal spray: 1 spray(s) nasal 4 times a day, As Needed  Xarelto 10 mg oral tablet: 1 tab(s) orally once a day

## 2021-08-18 NOTE — DISCHARGE NOTE PROVIDER - NSDCCPCAREPLAN_GEN_ALL_CORE_FT
PRINCIPAL DISCHARGE DIAGNOSIS  Diagnosis: Pneumonia due to COVID-19 virus  Assessment and Plan of Treatment: You tested positive for COVID 19.  You no longer require hospitalization.  Please restrict activities outside of your home except for getting medical care.  Do not go to work, school, or public areas.  Avoid using public transportation, ride-sharing, or taxis.  Separate yourself from other people and animals in your home.  Call ahead before visiting your doctor.  Wear a facemask when you are around other people. Cover your cough and sneezes.  Clean your hands often.  Avoid sharing personal household items.  Clean all frequently touched surfaces daily.   Please follow up with your primaryc are physocoan in one week         SECONDARY DISCHARGE DIAGNOSES  Diagnosis: Acute respiratory failure with hypoxia  Assessment and Plan of Treatment:     Diagnosis: Dizziness  Assessment and Plan of Treatment:      PRINCIPAL DISCHARGE DIAGNOSIS  Diagnosis: Pneumonia due to COVID-19 virus  Assessment and Plan of Treatment: You tested positive for COVID 19.  You no longer require hospitalization.  Please restrict activities outside of your home except for getting medical care.  Do not go to work, school, or public areas.  Avoid using public transportation, ride-sharing, or taxis.  Separate yourself from other people and animals in your home.  Call ahead before visiting your doctor.  Wear a facemask when you are around other people. Cover your cough and sneezes.  Clean your hands often.  Avoid sharing personal household items.  Clean all frequently touched surfaces daily.   Continue Xarelto 10 mg daily for 30 days to prevent blood clot in the setting of COVID infection   Please complete 10 days of Dexamethasone 6 mg daily   Please follow up with your primray care physician in one week         SECONDARY DISCHARGE DIAGNOSES  Diagnosis: Acute respiratory failure with hypoxia  Assessment and Plan of Treatment: Still require supplemental Oxygen 2 LPM via nasal cannula  Continue with supplemental oxygen  Follow up with your primary care physician       Diagnosis: Dizziness  Assessment and Plan of Treatment: Stable  Continue Meclizine as needed   Follow up with your primary care physician

## 2021-08-18 NOTE — DISCHARGE NOTE PROVIDER - NSDCHHNEEDSERVICE_GEN_ALL_CORE
Medication teaching and assessment/Rehabilitation services Medication teaching and assessment/Observation and assessment

## 2021-08-18 NOTE — DISCHARGE NOTE PROVIDER - HOSPITAL COURSE
65y F w/ PMHx Vertigo on Meclizine, GERD p/w nausea , dizziness and weakness, found to have COVID   CXR showed Bilateral lower lobe and peripherally predominant patchy opacities, compatible with infectious  etiology.  Requiring Oxygen 1LPM. s/p Remdesivir and Dexamethasone         65y F w/ PMHx Vertigo on Meclizine, GERD p/w nausea , dizziness and weakness, found to have COVID   CXR showed Bilateral lower lobe and peripherally predominant patchy opacities, compatible with infectious  etiology.  Now off O2. s/p Remdesivir and Dexamethasone         65y F w/ PMHx Vertigo on Meclizine, GERD p/w nausea , dizziness and weakness, found to have COVID   CXR showed Bilateral lower lobe and peripherally predominant patchy opacities, compatible with infectious  etiology.  Now off O2. s/p Remdesivir and Dexamethasone. Discharge home with home O2.

## 2021-08-19 LAB
ALBUMIN SERPL ELPH-MCNC: 3.8 G/DL — SIGNIFICANT CHANGE UP (ref 3.3–5)
ALP SERPL-CCNC: 54 U/L — SIGNIFICANT CHANGE UP (ref 40–120)
ALT FLD-CCNC: 34 U/L — SIGNIFICANT CHANGE UP (ref 10–45)
ANION GAP SERPL CALC-SCNC: 14 MMOL/L — SIGNIFICANT CHANGE UP (ref 5–17)
AST SERPL-CCNC: 36 U/L — SIGNIFICANT CHANGE UP (ref 10–40)
BILIRUB SERPL-MCNC: 0.4 MG/DL — SIGNIFICANT CHANGE UP (ref 0.2–1.2)
BUN SERPL-MCNC: 10 MG/DL — SIGNIFICANT CHANGE UP (ref 7–23)
CALCIUM SERPL-MCNC: 9.5 MG/DL — SIGNIFICANT CHANGE UP (ref 8.4–10.5)
CHLORIDE SERPL-SCNC: 99 MMOL/L — SIGNIFICANT CHANGE UP (ref 96–108)
CO2 SERPL-SCNC: 25 MMOL/L — SIGNIFICANT CHANGE UP (ref 22–31)
CREAT SERPL-MCNC: 0.88 MG/DL — SIGNIFICANT CHANGE UP (ref 0.5–1.3)
GLUCOSE SERPL-MCNC: 147 MG/DL — HIGH (ref 70–99)
POTASSIUM SERPL-MCNC: 5 MMOL/L — SIGNIFICANT CHANGE UP (ref 3.5–5.3)
POTASSIUM SERPL-SCNC: 5 MMOL/L — SIGNIFICANT CHANGE UP (ref 3.5–5.3)
PROT SERPL-MCNC: 7.1 G/DL — SIGNIFICANT CHANGE UP (ref 6–8.3)
SODIUM SERPL-SCNC: 138 MMOL/L — SIGNIFICANT CHANGE UP (ref 135–145)

## 2021-08-19 PROCEDURE — 99232 SBSQ HOSP IP/OBS MODERATE 35: CPT

## 2021-08-19 RX ORDER — ALPRAZOLAM 0.25 MG
0.25 TABLET ORAL ONCE
Refills: 0 | Status: DISCONTINUED | OUTPATIENT
Start: 2021-08-19 | End: 2021-08-19

## 2021-08-19 RX ADMIN — REMDESIVIR 500 MILLIGRAM(S): 5 INJECTION INTRAVENOUS at 12:11

## 2021-08-19 RX ADMIN — ENOXAPARIN SODIUM 40 MILLIGRAM(S): 100 INJECTION SUBCUTANEOUS at 12:11

## 2021-08-19 RX ADMIN — Medication 6 MILLIGRAM(S): at 21:43

## 2021-08-19 RX ADMIN — Medication 0.25 MILLIGRAM(S): at 21:42

## 2021-08-19 RX ADMIN — Medication 600 MILLIGRAM(S): at 16:21

## 2021-08-19 NOTE — CHART NOTE - NSCHARTNOTEFT_GEN_A_CORE
Patient presents with a diagnosis of covid-19. Acute exacerbation is currently resolved and patient will require 2 liters of O2 at home. Room air at rest oxygen saturation is 92%. OR IF NEEDED - Room air oxygen saturation on ambulation is 83%; oxygen saturation on 2 liters on ambulation is 94%.    Maria Dolores Milian NP-c  [phone]

## 2021-08-19 NOTE — PHARMACOTHERAPY INTERVENTION NOTE - COMMENTS
Patient is a 65y old female who presents with a chief complaint of dizziness x 2 weeks, with subsequent positive COVID-19 test on 8/16. Patient began remdesivir treatment on 8/16, with today being day 4 of 5 in treatment course. Per discussion on morning rounds, patient would be possible discharge today based on improved status, agreed with team to adjust administration time to 12:00 pm to ensure 4th dose would be received prior to discharge. LFTs WNL (AST 36, ALT 34) and eGFR of 68 as of this morning.    Thank you,  Jeff Bernardo, PharmD   PGY-1 Pharmacy Resident  #[id number] Patient is a 65y old female who presents with a chief complaint of dizziness x 2 weeks, with subsequent positive COVID-19 test on 8/16. Patient began remdesivir treatment on 8/16, with today being day 4 of 5 in treatment course. Per discussion on morning rounds, patient would be possible discharge today based on improved status, agreed with team to adjust administration time to 12:00 pm to ensure 4th dose would be received prior to discharge. LFTs WNL (AST 36, ALT 34) and adequate eGFR of 68 mL/min this morning.    Thank you,  Jeff Bernardo, PharmD   PGY-1 Pharmacy Resident  #[id number]

## 2021-08-20 ENCOUNTER — TRANSCRIPTION ENCOUNTER (OUTPATIENT)
Age: 66
End: 2021-08-20

## 2021-08-20 VITALS
DIASTOLIC BLOOD PRESSURE: 80 MMHG | TEMPERATURE: 99 F | SYSTOLIC BLOOD PRESSURE: 116 MMHG | OXYGEN SATURATION: 93 % | HEART RATE: 89 BPM | RESPIRATION RATE: 18 BRPM

## 2021-08-20 PROCEDURE — 0225U NFCT DS DNA&RNA 21 SARSCOV2: CPT

## 2021-08-20 PROCEDURE — 82728 ASSAY OF FERRITIN: CPT

## 2021-08-20 PROCEDURE — 85025 COMPLETE CBC W/AUTO DIFF WBC: CPT

## 2021-08-20 PROCEDURE — 99232 SBSQ HOSP IP/OBS MODERATE 35: CPT

## 2021-08-20 PROCEDURE — 86769 SARS-COV-2 COVID-19 ANTIBODY: CPT

## 2021-08-20 PROCEDURE — 84145 PROCALCITONIN (PCT): CPT

## 2021-08-20 PROCEDURE — 80076 HEPATIC FUNCTION PANEL: CPT

## 2021-08-20 PROCEDURE — 83605 ASSAY OF LACTIC ACID: CPT

## 2021-08-20 PROCEDURE — 80053 COMPREHEN METABOLIC PANEL: CPT

## 2021-08-20 PROCEDURE — 86140 C-REACTIVE PROTEIN: CPT

## 2021-08-20 PROCEDURE — 82565 ASSAY OF CREATININE: CPT

## 2021-08-20 PROCEDURE — 96374 THER/PROPH/DIAG INJ IV PUSH: CPT

## 2021-08-20 PROCEDURE — 71045 X-RAY EXAM CHEST 1 VIEW: CPT

## 2021-08-20 PROCEDURE — 93005 ELECTROCARDIOGRAM TRACING: CPT

## 2021-08-20 PROCEDURE — 99285 EMERGENCY DEPT VISIT HI MDM: CPT | Mod: 25

## 2021-08-20 PROCEDURE — 85027 COMPLETE CBC AUTOMATED: CPT

## 2021-08-20 PROCEDURE — 85379 FIBRIN DEGRADATION QUANT: CPT

## 2021-08-20 PROCEDURE — 85610 PROTHROMBIN TIME: CPT

## 2021-08-20 RX ORDER — RIVAROXABAN 15 MG-20MG
1 KIT ORAL
Qty: 30 | Refills: 0
Start: 2021-08-20 | End: 2021-09-18

## 2021-08-20 RX ORDER — SODIUM CHLORIDE 0.65 %
1 AEROSOL, SPRAY (ML) NASAL
Qty: 0 | Refills: 0 | DISCHARGE
Start: 2021-08-20

## 2021-08-20 RX ORDER — SODIUM CHLORIDE 0.65 %
1 AEROSOL, SPRAY (ML) NASAL
Refills: 0 | Status: DISCONTINUED | OUTPATIENT
Start: 2021-08-20 | End: 2021-08-20

## 2021-08-20 RX ORDER — DEXAMETHASONE 0.5 MG/5ML
1 ELIXIR ORAL
Qty: 6 | Refills: 0
Start: 2021-08-20 | End: 2021-08-25

## 2021-08-20 RX ORDER — ACETAMINOPHEN 500 MG
650 TABLET ORAL ONCE
Refills: 0 | Status: COMPLETED | OUTPATIENT
Start: 2021-08-20 | End: 2021-08-20

## 2021-08-20 RX ADMIN — Medication 600 MILLIGRAM(S): at 05:08

## 2021-08-20 RX ADMIN — Medication 650 MILLIGRAM(S): at 13:05

## 2021-08-20 RX ADMIN — REMDESIVIR 500 MILLIGRAM(S): 5 INJECTION INTRAVENOUS at 12:52

## 2021-08-20 RX ADMIN — ENOXAPARIN SODIUM 40 MILLIGRAM(S): 100 INJECTION SUBCUTANEOUS at 12:04

## 2021-08-20 RX ADMIN — Medication 650 MILLIGRAM(S): at 12:05

## 2021-08-20 NOTE — PROGRESS NOTE ADULT - PROVIDER SPECIALTY LIST ADULT
Infectious Disease
Infectious Disease
Internal Medicine
Infectious Disease
Infectious Disease
Internal Medicine

## 2021-08-20 NOTE — PROGRESS NOTE ADULT - REASON FOR ADMISSION
Dizziness x 2 weeks

## 2021-08-20 NOTE — DISCHARGE NOTE NURSING/CASE MANAGEMENT/SOCIAL WORK - NSDCPEFALRISK_GEN_ALL_CORE
For information on Fall & injury Prevention, visit [email or web address]

## 2021-08-20 NOTE — PROGRESS NOTE ADULT - SUBJECTIVE AND OBJECTIVE BOX
Patient is a 65y old  Female who presents with a chief complaint of Dizziness x 2 weeks (18 Aug 2021 18:31)      SUBJECTIVE / OVERNIGHT EVENTS:    Events noted.  CONSTITUTIONAL: No fever,  or fatigue  RESPIRATORY: No cough, wheezing,  No shortness of breath  CARDIOVASCULAR: No chest pain, palpitations, dizziness, or leg swelling  GASTROINTESTINAL: No abdominal or epigastric pain. No nausea, vomiting.  NEUROLOGICAL: No headaches,     MEDICATIONS  (STANDING):  chlorhexidine 2% Cloths 1 Application(s) Topical <User Schedule>  dexAMETHasone  Injectable 6 milliGRAM(s) IV Push daily  enoxaparin Injectable 40 milliGRAM(s) SubCutaneous daily  remdesivir  IVPB 100 milliGRAM(s) IV Intermittent every 24 hours  remdesivir  IVPB   IV Intermittent     MEDICATIONS  (PRN):        CAPILLARY BLOOD GLUCOSE        I&O's Summary    17 Aug 2021 07:01  -  18 Aug 2021 07:00  --------------------------------------------------------  IN: 1100 mL / OUT: 0 mL / NET: 1100 mL    18 Aug 2021 07:01  -  19 Aug 2021 01:05  --------------------------------------------------------  IN: 720 mL / OUT: 0 mL / NET: 720 mL        T(C): 36.8 (08-18-21 @ 20:28), Max: 36.8 (08-18-21 @ 20:28)  HR: 95 (08-18-21 @ 20:28) (79 - 95)  BP: 128/84 (08-18-21 @ 20:28) (112/80 - 128/84)  RR: 20 (08-18-21 @ 20:28) (18 - 20)  SpO2: 91% (08-18-21 @ 20:28) (85% - 96%)    PHYSICAL EXAM:  GENERAL: NAD  NECK: Supple, No JVD  CHEST/LUNG: Clear to auscultation bilaterally; No wheezing.  HEART: Regular rate and rhythm; No murmurs, rubs, or gallops  ABDOMEN: Soft, Nontender, Nondistended; Bowel sounds present  EXTREMITIES:   No edema  NEUROLOGY: AAO X 3      LABS:                        13.6   7.85  )-----------( 242      ( 18 Aug 2021 05:26 )             41.9     08-18    137  |  100  |  11  ----------------------------<  133<H>  5.3   |  24  |  0.86    Ca    9.5      18 Aug 2021 05:23    TPro  7.1  /  Alb  3.8  /  TBili  0.4  /  DBili  x   /  AST  39  /  ALT  33  /  AlkPhos  55  08-18    PT/INR - ( 17 Aug 2021 11:29 )   PT: 11.7 sec;   INR: 0.97 ratio                 CAPILLARY BLOOD GLUCOSE            RADIOLOGY & ADDITIONAL TESTS:    Imaging Personally Reviewed:    Consultant(s) Notes Reviewed:      Care Discussed with Consultants/Other Providers:    Triston Rojas MD, CMD, FACP    [address] [place] [address]  Office Tel: [phone]  Cell: [phone]  
CC: Patient is a 65y old  Female who presents with a chief complaint of Dizziness x 2 weeks (18 Aug 2021 18:31)    ID following for covid pna    Interval History/ROS: Patient has no complaints. No fevers, no chills. On 2L NC.    Rest of ROS negative.    Allergies  multivatimin b complex (Short breath)  multivitamin (Unknown)    ANTIMICROBIALS:  remdesivir  IVPB    remdesivir  IVPB 100 every 24 hours    OTHER MEDS:  chlorhexidine 2% Cloths 1 Application(s) Topical <User Schedule>  dexAMETHasone  Injectable 6 milliGRAM(s) IV Push daily  enoxaparin Injectable 40 milliGRAM(s) SubCutaneous daily  guaiFENesin  milliGRAM(s) Oral every 12 hours    PE:    Vital Signs Last 24 Hrs  T(C): 36.6 (19 Aug 2021 11:02), Max: 36.8 (18 Aug 2021 20:28)  T(F): 97.9 (19 Aug 2021 11:02), Max: 98.2 (18 Aug 2021 20:28)  HR: 100 (19 Aug 2021 11:02) (79 - 100)  BP: 123/81 (19 Aug 2021 11:02) (112/78 - 128/84)  BP(mean): --  RR: 16 (19 Aug 2021 14:00) (16 - 20)  SpO2: 94% (19 Aug 2021 14:00) (83% - 98%)    Gen: AOx3, NAD  CV: S1+S2 normal, no murmurs  Resp: Clear bilat, no resp distress  Abd: Soft, nontender, +BS  Ext: No LE edema, no wounds  : No Mallory  IV/Skin: No thrombophlebitis  Neuro: no focal deficits    LABS:                          13.6   7.85  )-----------( 242      ( 18 Aug 2021 05:26 )             41.9       08-19    138  |  99  |  10  ----------------------------<  147<H>  5.0   |  25  |  0.88    Ca    9.5      19 Aug 2021 06:34    TPro  7.1  /  Alb  3.8  /  TBili  0.4  /  DBili  x   /  AST  36  /  ALT  34  /  AlkPhos  54  08-19    MICROBIOLOGY:  v    Rapid RVP Result: Detected (08-16 @ 00:03)    RADIOLOGY:    < from: Xray Chest 1 View- PORTABLE-Urgent (08.16.21 @ 00:27) >    IMPRESSION:  Bilateral lower lobe and peripherally predominant patchy opacities, compatible with infectious  etiology.    < end of copied text >  
Patient is a 65y old  Female who presents with a chief complaint of Dizziness x 2 weeks (18 Aug 2021 18:31)      SUBJECTIVE / OVERNIGHT EVENTS:    Events noted.  CONSTITUTIONAL: No fever,  or fatigue  RESPIRATORY: On supp O2  CARDIOVASCULAR: No chest pain, palpitations, dizziness, or leg swelling  GASTROINTESTINAL: No abdominal or epigastric pain. No nausea, vomiting.  NEUROLOGICAL: No headaches,     MEDICATIONS  (STANDING):  chlorhexidine 2% Cloths 1 Application(s) Topical <User Schedule>  dexAMETHasone  Injectable 6 milliGRAM(s) IV Push daily  enoxaparin Injectable 40 milliGRAM(s) SubCutaneous daily  remdesivir  IVPB 100 milliGRAM(s) IV Intermittent every 24 hours  remdesivir  IVPB   IV Intermittent     MEDICATIONS  (PRN):        CAPILLARY BLOOD GLUCOSE        I&O's Summary    17 Aug 2021 07:01  -  18 Aug 2021 07:00  --------------------------------------------------------  IN: 1100 mL / OUT: 0 mL / NET: 1100 mL    18 Aug 2021 07:01  -  19 Aug 2021 01:05  --------------------------------------------------------  IN: 720 mL / OUT: 0 mL / NET: 720 mL        T(C): 36.8 (08-18-21 @ 20:28), Max: 36.8 (08-18-21 @ 20:28)  HR: 95 (08-18-21 @ 20:28) (79 - 95)  BP: 128/84 (08-18-21 @ 20:28) (112/80 - 128/84)  RR: 20 (08-18-21 @ 20:28) (18 - 20)  SpO2: 91% (08-18-21 @ 20:28) (85% - 96%)    PHYSICAL EXAM:  GENERAL: NAD  NECK: Supple, No JVD  CHEST/LUNG: Clear to auscultation bilaterally; No wheezing.  HEART: Regular rate and rhythm; No murmurs, rubs, or gallops  ABDOMEN: Soft, Nontender, Nondistended; Bowel sounds present  EXTREMITIES:   No edema  NEUROLOGY: AAO X 3      LABS:                        13.6   7.85  )-----------( 242      ( 18 Aug 2021 05:26 )             41.9     08-18    137  |  100  |  11  ----------------------------<  133<H>  5.3   |  24  |  0.86    Ca    9.5      18 Aug 2021 05:23    TPro  7.1  /  Alb  3.8  /  TBili  0.4  /  DBili  x   /  AST  39  /  ALT  33  /  AlkPhos  55  08-18    PT/INR - ( 17 Aug 2021 11:29 )   PT: 11.7 sec;   INR: 0.97 ratio                 CAPILLARY BLOOD GLUCOSE            RADIOLOGY & ADDITIONAL TESTS:    Imaging Personally Reviewed:    Consultant(s) Notes Reviewed:      Care Discussed with Consultants/Other Providers:    Triston Rojas MD, CMD, FACP    [address] [place] [address]  Office Tel: [phone]  Cell: [phone]  
CC: Patient is a 65y old  Female who presents with a chief complaint of Dizziness x 2 weeks (16 Aug 2021 12:50)    ID following for COVID PNA    Interval History/ROS: Patient remains lethargic and weak. No fevers, no chills. On 1 L NC.    Rest of ROS negative.    Allergies  multivatimin b complex (Short breath)  multivitamin (Unknown)    ANTIMICROBIALS:  remdesivir  IVPB 100 every 24 hours  remdesivir  IVPB      OTHER MEDS:  chlorhexidine 2% Cloths 1 Application(s) Topical <User Schedule>  dexAMETHasone  Injectable 6 milliGRAM(s) IV Push daily  enoxaparin Injectable 40 milliGRAM(s) SubCutaneous daily    PE:    Vital Signs Last 24 Hrs  T(C): 37 (17 Aug 2021 12:48), Max: 37 (16 Aug 2021 22:12)  T(F): 98.6 (17 Aug 2021 12:48), Max: 98.6 (16 Aug 2021 22:12)  HR: 86 (17 Aug 2021 12:48) (79 - 92)  BP: 118/73 (17 Aug 2021 12:48) (116/79 - 120/77)  BP(mean): --  RR: 18 (17 Aug 2021 12:48) (18 - 18)  SpO2: 91% (17 Aug 2021 12:48) (90% - 94%)    Gen: AOx3, NAD  CV: S1+S2 normal, no murmurs  Resp: Clear bilat, no resp distress  Abd: Soft, nontender, +BS  Ext: No LE edema, no wounds  : No Mallory  IV/Skin: No thrombophlebitis  Neuro: no focal deficits    LABS:                          14.2   7.25  )-----------( 220      ( 17 Aug 2021 11:29 )             44.2       08-17    x   |  x   |  x   ----------------------------<  x   x    |  x   |  1.01    Ca    9.5      17 Aug 2021 09:48    TPro  7.9  /  Alb  4.4  /  TBili  0.5  /  DBili  0.1  /  AST  49<H>  /  ALT  39  /  AlkPhos  62  08-17    MICROBIOLOGY:  v    Rapid RVP Result: Detected (08-16 @ 00:03)    RADIOLOGY:    < from: Xray Chest 1 View- PORTABLE-Urgent (08.16.21 @ 00:27) >  IMPRESSION:  Bilateral lower lobe and peripherally predominant patchy opacities, compatible with infectious  etiology.    < end of copied text >  
CC: Patient is a 65y old  Female who presents with a chief complaint of Dizziness x 2 weeks (17 Aug 2021 16:09)    ID following for COVID PNA    Interval History/ROS: Patient remains on 1 L NC. No fevers, no chills.    Rest of ROS negative.    Allergies  multivatimin b complex (Short breath)  multivitamin (Unknown)    ANTIMICROBIALS:  remdesivir  IVPB 100 every 24 hours  remdesivir  IVPB      OTHER MEDS:  chlorhexidine 2% Cloths 1 Application(s) Topical <User Schedule>  dexAMETHasone  Injectable 6 milliGRAM(s) IV Push daily  enoxaparin Injectable 40 milliGRAM(s) SubCutaneous daily    PE:    Vital Signs Last 24 Hrs  T(C): 36.2 (18 Aug 2021 10:37), Max: 36.7 (18 Aug 2021 04:28)  T(F): 97.1 (18 Aug 2021 10:37), Max: 98.1 (18 Aug 2021 04:28)  HR: 87 (18 Aug 2021 10:37) (79 - 87)  BP: 112/80 (18 Aug 2021 10:37) (111/71 - 114/72)  BP(mean): --  RR: 18 (18 Aug 2021 11:00) (18 - 18)  SpO2: 88% (18 Aug 2021 11:00) (88% - 95%)    Gen: AOx3, NAD  CV: S1+S2 normal, no murmurs  Resp: Clear bilat, no resp distress  Abd: Soft, nontender, +BS  Ext: No LE edema, no wounds  : No Mallory  IV/Skin: No thrombophlebitis  Neuro: no focal deficits    LABS:                          13.6   7.85  )-----------( 242      ( 18 Aug 2021 05:26 )             41.9       08-18    137  |  100  |  11  ----------------------------<  133<H>  5.3   |  24  |  0.86    Ca    9.5      18 Aug 2021 05:23    TPro  7.1  /  Alb  3.8  /  TBili  0.4  /  DBili  x   /  AST  39  /  ALT  33  /  AlkPhos  55  08-18    MICROBIOLOGY:  v    Rapid RVP Result: Detected (08-16 @ 00:03)    RADIOLOGY:    < from: Xray Chest 1 View- PORTABLE-Urgent (08.16.21 @ 00:27) >  IMPRESSION:  Bilateral lower lobe and peripherally predominant patchy opacities, compatible with infectious  etiology.    < end of copied text >  
CC: Patient is a 65y old  Female who presents with a chief complaint of Dizziness x 2 weeks (19 Aug 2021 17:20)    ID following for COVID PNA    Interval History/ROS: Patient remains on 2L NC. No complaints. Sitting in a chair comfortable. Planned for discharge today with home O2.    Rest of ROS negative.    Allergies  multivatimin b complex (Short breath)  multivitamin (Unknown)    ANTIMICROBIALS:      OTHER MEDS:  chlorhexidine 2% Cloths 1 Application(s) Topical <User Schedule>  dexAMETHasone  Injectable 6 milliGRAM(s) IV Push daily  enoxaparin Injectable 40 milliGRAM(s) SubCutaneous daily  guaiFENesin  milliGRAM(s) Oral every 12 hours  sodium chloride 0.65% Nasal 1 Spray(s) Both Nostrils four times a day PRN    PE:    Vital Signs Last 24 Hrs  T(C): 37.1 (20 Aug 2021 10:39), Max: 37.1 (20 Aug 2021 10:39)  T(F): 98.8 (20 Aug 2021 10:39), Max: 98.8 (20 Aug 2021 10:39)  HR: 89 (20 Aug 2021 10:39) (82 - 90)  BP: 116/80 (20 Aug 2021 10:39) (113/75 - 119/79)  BP(mean): --  RR: 18 (20 Aug 2021 10:39) (16 - 18)  SpO2: 93% (20 Aug 2021 10:39) (93% - 98%)    Gen: AOx3, NAD  CV: S1+S2 normal, no murmurs  Resp: Clear bilat, no resp distress  Abd: Soft, nontender, +BS  Ext: No LE edema, no wounds  : No Mallory  IV/Skin: No thrombophlebitis  Neuro: no focal deficits    LABS:        08-19    138  |  99  |  10  ----------------------------<  147<H>  5.0   |  25  |  0.88    Ca    9.5      19 Aug 2021 06:34    TPro  7.1  /  Alb  3.8  /  TBili  0.4  /  DBili  x   /  AST  36  /  ALT  34  /  AlkPhos  54  08-19    MICROBIOLOGY:  v    Rapid RVP Result: Detected (08-16 @ 00:03)    RADIOLOGY:  < from: Xray Chest 1 View- PORTABLE-Urgent (08.16.21 @ 00:27) >  IMPRESSION:  Bilateral lower lobe and peripherally predominant patchy opacities, compatible with infectious  etiology.    < end of copied text >    
Patient is a 65y old  Female who presents with a chief complaint of Dizziness x 2 weeks (17 Aug 2021 13:35)      SUBJECTIVE / OVERNIGHT EVENTS:    Events noted.  CONSTITUTIONAL: No fever,  or fatigue  RESPIRATORY: No cough, wheezing,  No shortness of breath  CARDIOVASCULAR: No chest pain, palpitations  GASTROINTESTINAL: No abdominal or epigastric pain.   NEUROLOGICAL: No headaches,     MEDICATIONS  (STANDING):  chlorhexidine 2% Cloths 1 Application(s) Topical <User Schedule>  dexAMETHasone  Injectable 6 milliGRAM(s) IV Push daily  enoxaparin Injectable 40 milliGRAM(s) SubCutaneous daily  remdesivir  IVPB 100 milliGRAM(s) IV Intermittent every 24 hours  remdesivir  IVPB   IV Intermittent     MEDICATIONS  (PRN):        CAPILLARY BLOOD GLUCOSE        I&O's Summary    16 Aug 2021 07:01  -  17 Aug 2021 07:00  --------------------------------------------------------  IN: 540 mL / OUT: 0 mL / NET: 540 mL    17 Aug 2021 07:01  -  18 Aug 2021 00:10  --------------------------------------------------------  IN: 1100 mL / OUT: 0 mL / NET: 1100 mL        T(C): 36.2 (08-17-21 @ 21:50), Max: 37 (08-17-21 @ 12:48)  HR: 80 (08-17-21 @ 21:50) (80 - 86)  BP: 111/71 (08-17-21 @ 21:50) (111/71 - 118/73)  RR: 18 (08-17-21 @ 21:50) (18 - 18)  SpO2: 95% (08-17-21 @ 21:50) (90% - 95%)    PHYSICAL EXAM:  GENERAL: NAD  NECK: Supple, No JVD  CHEST/LUNG: Clear to auscultation bilaterally; No wheezing.  HEART: Regular rate and rhythm; No murmurs, rubs, or gallops  ABDOMEN: Soft, Nontender, Nondistended; Bowel sounds present  EXTREMITIES:   No edema  NEUROLOGY: AAO X 3      LABS:                        14.2   7.25  )-----------( 220      ( 17 Aug 2021 11:29 )             44.2     08-17    x   |  x   |  x   ----------------------------<  x   x    |  x   |  1.01    Ca    9.5      17 Aug 2021 09:48    TPro  7.9  /  Alb  4.4  /  TBili  0.5  /  DBili  0.1  /  AST  49<H>  /  ALT  39  /  AlkPhos  62  08-17    PT/INR - ( 17 Aug 2021 11:29 )   PT: 11.7 sec;   INR: 0.97 ratio                 CAPILLARY BLOOD GLUCOSE            RADIOLOGY & ADDITIONAL TESTS:    Imaging Personally Reviewed:    Consultant(s) Notes Reviewed:      Care Discussed with Consultants/Other Providers:    Triston Rojas MD, CMD, FACP    [address] [place] [address]  Office Tel: [phone]  Cell: [phone]  
Patient is a 65y old  Female who presents with a chief complaint of Dizziness x 2 weeks (18 Aug 2021 18:31)      SUBJECTIVE / OVERNIGHT EVENTS:    Events noted.  CONSTITUTIONAL: No fever,  or fatigue  RESPIRATORY: On supp O2  CARDIOVASCULAR: No chest pain, palpitations, dizziness, or leg swelling  GASTROINTESTINAL: No abdominal or epigastric pain. No nausea, vomiting.  NEUROLOGICAL: No headaches,     MEDICATIONS  (STANDING):  chlorhexidine 2% Cloths 1 Application(s) Topical <User Schedule>  dexAMETHasone  Injectable 6 milliGRAM(s) IV Push daily  enoxaparin Injectable 40 milliGRAM(s) SubCutaneous daily  remdesivir  IVPB 100 milliGRAM(s) IV Intermittent every 24 hours  remdesivir  IVPB   IV Intermittent     MEDICATIONS  (PRN):        CAPILLARY BLOOD GLUCOSE        I&O's Summary    17 Aug 2021 07:01  -  18 Aug 2021 07:00  --------------------------------------------------------  IN: 1100 mL / OUT: 0 mL / NET: 1100 mL    18 Aug 2021 07:01  -  19 Aug 2021 01:05  --------------------------------------------------------  IN: 720 mL / OUT: 0 mL / NET: 720 mL        T(C): 36.8 (08-18-21 @ 20:28), Max: 36.8 (08-18-21 @ 20:28)  HR: 95 (08-18-21 @ 20:28) (79 - 95)  BP: 128/84 (08-18-21 @ 20:28) (112/80 - 128/84)  RR: 20 (08-18-21 @ 20:28) (18 - 20)  SpO2: 91% (08-18-21 @ 20:28) (85% - 96%)    PHYSICAL EXAM:  GENERAL: NAD  NECK: Supple, No JVD  CHEST/LUNG: Clear to auscultation bilaterally; No wheezing.  HEART: Regular rate and rhythm; No murmurs, rubs, or gallops  ABDOMEN: Soft, Nontender, Nondistended; Bowel sounds present  EXTREMITIES:   No edema  NEUROLOGY: AAO X 3      LABS:                        13.6   7.85  )-----------( 242      ( 18 Aug 2021 05:26 )             41.9     08-18    137  |  100  |  11  ----------------------------<  133<H>  5.3   |  24  |  0.86    Ca    9.5      18 Aug 2021 05:23    TPro  7.1  /  Alb  3.8  /  TBili  0.4  /  DBili  x   /  AST  39  /  ALT  33  /  AlkPhos  55  08-18    PT/INR - ( 17 Aug 2021 11:29 )   PT: 11.7 sec;   INR: 0.97 ratio                 CAPILLARY BLOOD GLUCOSE            RADIOLOGY & ADDITIONAL TESTS:    Imaging Personally Reviewed:    Consultant(s) Notes Reviewed:      Care Discussed with Consultants/Other Providers:    Triston Rojas MD, CMD, FACP    [address] [place] [address]  Office Tel: [phone]  Cell: [phone]

## 2021-08-20 NOTE — DISCHARGE NOTE NURSING/CASE MANAGEMENT/SOCIAL WORK - PATIENT PORTAL LINK FT
You can access the FollowMyHealth Patient Portal offered by [facility] by registering at the following website: [email or web address]. By joining [employer]’s FollowMyHealth portal, you will also be able to view your health information using other applications (apps) compatible with our system.

## 2021-08-20 NOTE — DISCHARGE NOTE NURSING/CASE MANAGEMENT/SOCIAL WORK - NSCORESITESY/N_GEN_A_CORE_RD
If > -0.75 cyl persists after YAG OD, and pt still unhappy with [facility], OK to see [address] to schedule Epi-LASEK OD Goal: sandy No

## 2021-08-20 NOTE — PROGRESS NOTE ADULT - ASSESSMENT
65 year old female with vertigo on meclizine, GERD presenting with nausea, dizziness, weakness, and vomiting that started about two weeks ago, patient states with worsening symptoms so came to the ED. Febrile in the .9F. COVID PCR + 8/16/2021. CXR with bilateral patchy opacities.    #COVID PNA with hypoxia  -Continue supportive care  -Continue dexamethasone and Remdesivir per hospital protocol  -Wean off supplemental O2 as tolerated, currently on 2L NC.  -Trend inflammatory markers  -Trend d-dimer  -Trend CrCl and LFTs daily while on Remdesivir  -Low PCT points away from a bacterial process, can monitor off abx.    Simon Jones MD  Pager [phone]  After 5pm/weekends call [phone]  
65 year old female with vertigo on meclizine, GERD presenting with nausea, dizziness, weakness, and vomiting that started about two weeks ago, patient states with worsening symptoms so came to the ED. Febrile in the .9F. WBC WNL. Ferritin 2319. PCT 0.08. COVID PCR + 8/16/2021. CXR with bilateral patchy opacities.    #COVID PNA with hypoxia  -Continue supportive care  -Continue dexamethasone and Remdesivir per hospital protocol  -Wean off supplemental O2 as tolerated, currently on 1L NC.  -Trend inflammatory markers  -Trend d-dimer  -Trend CrCl and LFTs daily while on Remdesivir  -Low PCT points away from a bacterial process, can monitor off abx.    Simon Jones MD  Pager [phone]  After 5pm/weekends call [phone]  
65y F w/ PMHx Vertigo on Meclizine, GERD p/w COVID      # Acute Hypoxic respiratory Failure   # COVID Pneumonia     IV Decadron, Remdesivir.   ID f/up appreciated  Monitor serum markers. 
65y F w/ PMHx Vertigo on Meclizine, GERD p/w COVID      # Acute Hypoxic respiratory Failure   # COVID Pneumonia     S/p IV Decadron, Remdesivir.   ID f/up appreciated  
65 year old female with vertigo on meclizine, GERD presenting with nausea, dizziness, weakness, and vomiting that started about two weeks ago, patient states with worsening symptoms so came to the ED. Febrile in the .9F. COVID PCR + 8/16/2021. CXR with bilateral patchy opacities.    #COVID PNA with hypoxia  -Continue supportive care  -Continue dexamethasone and Remdesivir per hospital protocol  -Wean off supplemental O2 as tolerated, currently on 2L NC.  -Trend inflammatory markers  -Trend d-dimer  -Trend CrCl and LFTs daily while on Remdesivir  -Low PCT points away from a bacterial process, can monitor off abx.    Simon Jones MD  Pager [phone]  After 5pm/weekends call [phone]    Please call with questions.
65 year old female with vertigo on meclizine, GERD presenting with nausea, dizziness, weakness, and vomiting that started about two weeks ago, patient states with worsening symptoms so came to the ED. Febrile in the .9F. WBC WNL. Ferritin 2319. PCT 0.08. COVID PCR + 8/16/2021. CXR with bilateral patchy opacities.    #COVID PNA with hypoxia  -Continue supportive care  -Continue dexamethasone and Remdesivir per hospital protocol  -Wean off supplemental O2 as tolerated, currently on 1L NC.  -Trend inflammatory markers  -Trend d-dimer  -Trend CrCl and LFTs daily while on Remdesivir  -Low PCT points away from a bacterial process, can monitor off abx.    Simon Jones MD  Pager [phone]  After 5pm/weekends call [phone]  
65y F w/ PMHx Vertigo on Meclizine, GERD p/w COVID      # Acute Hypoxic respiratory Failure   # COVID Pneumonia     IV Decadron, Remdesivir.   ID f/up appreciated  Monitor serum markers. 
65y F w/ PMHx Vertigo on Meclizine, GERD p/w COVID      # Acute Hypoxic respiratory Failure   # COVID Pneumonia     IV Decadron, Remdesivir.   ID f/up appreciated  Monitor serum markers.

## 2021-09-22 ENCOUNTER — EMERGENCY (EMERGENCY)
Facility: HOSPITAL | Age: 66
LOS: 0 days | Discharge: HOME | End: 2021-09-22
Attending: STUDENT IN AN ORGANIZED HEALTH CARE EDUCATION/TRAINING PROGRAM
Payer: MEDICARE

## 2021-09-22 VITALS
OXYGEN SATURATION: 99 % | RESPIRATION RATE: 28 BRPM | TEMPERATURE: 98 F | HEART RATE: 98 BPM | SYSTOLIC BLOOD PRESSURE: 151 MMHG | DIASTOLIC BLOOD PRESSURE: 88 MMHG

## 2021-09-22 VITALS
SYSTOLIC BLOOD PRESSURE: 144 MMHG | DIASTOLIC BLOOD PRESSURE: 95 MMHG | WEIGHT: 134.04 LBS | HEIGHT: 63 IN | HEART RATE: 98 BPM | OXYGEN SATURATION: 96 % | TEMPERATURE: 98 F | RESPIRATION RATE: 18 BRPM

## 2021-09-22 DIAGNOSIS — F41.9 ANXIETY DISORDER, UNSPECIFIED: ICD-10-CM

## 2021-09-22 DIAGNOSIS — J39.2 OTHER DISEASES OF PHARYNX: ICD-10-CM

## 2021-09-22 DIAGNOSIS — Z98.89 OTHER SPECIFIED POSTPROCEDURAL STATES: Chronic | ICD-10-CM

## 2021-09-22 DIAGNOSIS — R06.02 SHORTNESS OF BREATH: ICD-10-CM

## 2021-09-22 DIAGNOSIS — R09.89 OTHER SPECIFIED SYMPTOMS AND SIGNS INVOLVING THE CIRCULATORY AND RESPIRATORY SYSTEMS: ICD-10-CM

## 2021-09-22 DIAGNOSIS — Z86.16 PERSONAL HISTORY OF COVID-19: ICD-10-CM

## 2021-09-22 DIAGNOSIS — R06.00 DYSPNEA, UNSPECIFIED: ICD-10-CM

## 2021-09-22 DIAGNOSIS — Z88.8 ALLERGY STATUS TO OTHER DRUGS, MEDICAMENTS AND BIOLOGICAL SUBSTANCES STATUS: ICD-10-CM

## 2021-09-22 DIAGNOSIS — E78.5 HYPERLIPIDEMIA, UNSPECIFIED: ICD-10-CM

## 2021-09-22 DIAGNOSIS — E03.9 HYPOTHYROIDISM, UNSPECIFIED: ICD-10-CM

## 2021-09-22 DIAGNOSIS — Z98.42 CATARACT EXTRACTION STATUS, LEFT EYE: Chronic | ICD-10-CM

## 2021-09-22 DIAGNOSIS — Z98.41 CATARACT EXTRACTION STATUS, RIGHT EYE: Chronic | ICD-10-CM

## 2021-09-22 LAB
ALBUMIN SERPL ELPH-MCNC: 3.6 G/DL — SIGNIFICANT CHANGE UP (ref 3.3–5)
ALP SERPL-CCNC: 90 U/L — SIGNIFICANT CHANGE UP (ref 40–120)
ALT FLD-CCNC: 19 U/L — SIGNIFICANT CHANGE UP (ref 12–78)
ANION GAP SERPL CALC-SCNC: 9 MMOL/L — SIGNIFICANT CHANGE UP (ref 5–17)
AST SERPL-CCNC: 20 U/L — SIGNIFICANT CHANGE UP (ref 15–37)
BASOPHILS # BLD AUTO: 0.04 K/UL — SIGNIFICANT CHANGE UP (ref 0–0.2)
BASOPHILS NFR BLD AUTO: 0.5 % — SIGNIFICANT CHANGE UP (ref 0–2)
BILIRUB SERPL-MCNC: 1 MG/DL — SIGNIFICANT CHANGE UP (ref 0.2–1.2)
BUN SERPL-MCNC: 14 MG/DL — SIGNIFICANT CHANGE UP (ref 7–23)
CALCIUM SERPL-MCNC: 9.8 MG/DL — SIGNIFICANT CHANGE UP (ref 8.5–10.1)
CHLORIDE SERPL-SCNC: 103 MMOL/L — SIGNIFICANT CHANGE UP (ref 96–108)
CO2 SERPL-SCNC: 28 MMOL/L — SIGNIFICANT CHANGE UP (ref 22–31)
CREAT SERPL-MCNC: 0.71 MG/DL — SIGNIFICANT CHANGE UP (ref 0.5–1.3)
EOSINOPHIL # BLD AUTO: 0.01 K/UL — SIGNIFICANT CHANGE UP (ref 0–0.5)
EOSINOPHIL NFR BLD AUTO: 0.1 % — SIGNIFICANT CHANGE UP (ref 0–6)
GLUCOSE SERPL-MCNC: 86 MG/DL — SIGNIFICANT CHANGE UP (ref 70–99)
HCT VFR BLD CALC: 39.7 % — SIGNIFICANT CHANGE UP (ref 34.5–45)
HGB BLD-MCNC: 13.2 G/DL — SIGNIFICANT CHANGE UP (ref 11.5–15.5)
IMM GRANULOCYTES NFR BLD AUTO: 0.9 % — SIGNIFICANT CHANGE UP (ref 0–1.5)
LACTATE SERPL-SCNC: 1.7 MMOL/L — SIGNIFICANT CHANGE UP (ref 0.7–2)
LYMPHOCYTES # BLD AUTO: 2.23 K/UL — SIGNIFICANT CHANGE UP (ref 1–3.3)
LYMPHOCYTES # BLD AUTO: 29.2 % — SIGNIFICANT CHANGE UP (ref 13–44)
MCHC RBC-ENTMCNC: 27.4 PG — SIGNIFICANT CHANGE UP (ref 27–34)
MCHC RBC-ENTMCNC: 33.2 GM/DL — SIGNIFICANT CHANGE UP (ref 32–36)
MCV RBC AUTO: 82.5 FL — SIGNIFICANT CHANGE UP (ref 80–100)
MONOCYTES # BLD AUTO: 0.77 K/UL — SIGNIFICANT CHANGE UP (ref 0–0.9)
MONOCYTES NFR BLD AUTO: 10.1 % — SIGNIFICANT CHANGE UP (ref 2–14)
NEUTROPHILS # BLD AUTO: 4.51 K/UL — SIGNIFICANT CHANGE UP (ref 1.8–7.4)
NEUTROPHILS NFR BLD AUTO: 59.2 % — SIGNIFICANT CHANGE UP (ref 43–77)
NRBC # BLD: 0 /100 WBCS — SIGNIFICANT CHANGE UP (ref 0–0)
NT-PROBNP SERPL-SCNC: 41 PG/ML — SIGNIFICANT CHANGE UP (ref 0–125)
PLATELET # BLD AUTO: 243 K/UL — SIGNIFICANT CHANGE UP (ref 150–400)
POTASSIUM SERPL-MCNC: 3.8 MMOL/L — SIGNIFICANT CHANGE UP (ref 3.5–5.3)
POTASSIUM SERPL-SCNC: 3.8 MMOL/L — SIGNIFICANT CHANGE UP (ref 3.5–5.3)
PROT SERPL-MCNC: 7.3 GM/DL — SIGNIFICANT CHANGE UP (ref 6–8.3)
RBC # BLD: 4.81 M/UL — SIGNIFICANT CHANGE UP (ref 3.8–5.2)
RBC # FLD: 13.6 % — SIGNIFICANT CHANGE UP (ref 10.3–14.5)
SODIUM SERPL-SCNC: 140 MMOL/L — SIGNIFICANT CHANGE UP (ref 135–145)
TROPONIN I SERPL-MCNC: <.015 NG/ML — SIGNIFICANT CHANGE UP (ref 0.01–0.04)
WBC # BLD: 7.63 K/UL — SIGNIFICANT CHANGE UP (ref 3.8–10.5)
WBC # FLD AUTO: 7.63 K/UL — SIGNIFICANT CHANGE UP (ref 3.8–10.5)

## 2021-09-22 PROCEDURE — 93010 ELECTROCARDIOGRAM REPORT: CPT

## 2021-09-22 PROCEDURE — 99285 EMERGENCY DEPT VISIT HI MDM: CPT

## 2021-09-22 PROCEDURE — 71045 X-RAY EXAM CHEST 1 VIEW: CPT | Mod: 26

## 2021-09-22 RX ORDER — SODIUM CHLORIDE 9 MG/ML
1000 INJECTION INTRAMUSCULAR; INTRAVENOUS; SUBCUTANEOUS ONCE
Refills: 0 | Status: COMPLETED | OUTPATIENT
Start: 2021-09-22 | End: 2021-09-22

## 2021-09-22 RX ORDER — FLUTICASONE PROPIONATE 50 MCG
50 SPRAY, SUSPENSION NASAL
Qty: 20 | Refills: 0
Start: 2021-09-22 | End: 2021-09-26

## 2021-09-22 RX ORDER — FLUTICASONE PROPIONATE 50 MCG
1 SPRAY, SUSPENSION NASAL ONCE
Refills: 0 | Status: COMPLETED | OUTPATIENT
Start: 2021-09-22 | End: 2021-09-22

## 2021-09-22 RX ADMIN — Medication 1200 MILLIGRAM(S): at 21:46

## 2021-09-22 RX ADMIN — Medication 1 SPRAY(S): at 21:45

## 2021-09-22 RX ADMIN — SODIUM CHLORIDE 1000 MILLILITER(S): 9 INJECTION INTRAMUSCULAR; INTRAVENOUS; SUBCUTANEOUS at 21:50

## 2021-09-22 RX ADMIN — Medication 1 MILLIGRAM(S): at 21:48

## 2021-09-22 NOTE — ED PROVIDER NOTE - PROGRESS NOTE DETAILS
Other daughter now at bedside, reports pt has not slept in > 24hrs, requesting medication to aid w/ sleep. D/w daughter, will d/c. Give Psych. Neuro f/u. W/u w/o significant abnormalities. Pt able to tolerate PO. On re-eval, resting comfortably, in NAD. Pt reports symptoms improved w/ ED meds. D/w daughters. Stable for d/c home. Given scripts Flonase, Mucinex, short-course Ativan. Given and recommend close outpatient Psych, Neuro, PCP f/u. Return signs / symptoms d/w pt and daughters. They understand and agree w/ this plan.

## 2021-09-22 NOTE — ED PROVIDER NOTE - PROGRESS NOTE
Scheduled for:  Colonoscopy 97347  Provider Name: Dr. Joseph Bonilla  Date:  11/30/18  Location:  [facility]  Sedation:  MAC  Time:  4019 (pt is aware to arrive at 1245)   Prep:  Suprep  Meds/Allergies Reconciled?:  Physician reviewed   Diagnosis with codes:  Colon cancer sc Stable. Improved.

## 2021-09-22 NOTE — ED ADULT NURSE NOTE - NSIMPLEMENTINTERV_GEN_ALL_ED
Implemented All Universal Safety Interventions:  [place] to call system. Call bell, personal items and telephone within reach. Instruct patient to call for assistance. Room bathroom lighting operational. Non-slip footwear when patient is off stretcher. Physically safe environment: no spills, clutter or unnecessary equipment. Stretcher in lowest position, wheels locked, appropriate side rails in place.

## 2021-09-22 NOTE — ED PROVIDER NOTE - CLINICAL SUMMARY MEDICAL DECISION MAKING FREE TEXT BOX
66yo F w/ PMH hypothyroid, anxiety / depression, HLD, s/p COVID 3mo ago pw thick mucus in throat, w/ unable to eat / drink / breath 2/2 congestion. AFVSS. Well appearing, in NAD. Plan: CBC, CMP, trop, BNP, CXR, lactate, ECG. Give Mucinex, Flonase. Re-eval. 64yo F w/ PMH hypothyroid, anxiety / depression, HLD, s/p COVID 3mo ago pw thick mucus in throat, w/ unable to eat / drink / breath 2/2 congestion. AFVSS. Well appearing, in NAD. Plan: CBC, CMP, trop, BNP, CXR, lactate, ECG. Give Mucinex, Flonase, Ativan. Re-eval.

## 2021-09-22 NOTE — ED ADULT TRIAGE NOTE - CHIEF COMPLAINT QUOTE
c/o difficulty breathing intermittently over past few weeks states throat feels irritated from mucus daughter states not eating much over past few days hx anxiety denies chest pain

## 2021-09-22 NOTE — ED ADULT NURSE NOTE - DISCHARGE DATE/TIME
ANTICOAGULATION FOLLOW-UP CLINIC VISIT    Patient Name:  Ellie Leigh  Date:  8/14/2018  Contact Type:  Face to Face    SUBJECTIVE:     Patient Findings     Positives Unexplained INR or factor level change    Comments No changes in medications, activity, or diet noted. No bleeding or increased bruising noted. Took warfarin as prescribed.  Patient is to take 1.5 mg today, then resume maintenance warfarin plan, and check INR in one week. If pt is to reschedule, she was instructed to call [facility] to see if coumadin dosing needs to be adjusted to new INR check.  Patient verbalizes understanding and agrees to plan. No further questions or concerns.           OBJECTIVE    INR Protime   Date Value Ref Range Status   08/14/2018 1.5 (A) 0.86 - 1.14 Final       ASSESSMENT / PLAN  INR assessment SUB    Recheck INR In: 1 WEEK    INR Location Clinic      Anticoagulation Summary as of 8/14/2018     INR goal    Prior goal 1.5-2.0   Today's INR 1.5   Warfarin maintenance plan 0.5 mg (1 mg x 0.5) on Wed; 1 mg (1 mg x 1) all other days   Full warfarin instructions 8/14: 1.5 mg; Otherwise 0.5 mg on Wed; 1 mg all other days   Weekly warfarin total 6.5 mg   Plan last modified Aysha Canada, RN (6/13/2018)   Next INR check 8/21/2018   Priority INR   Target end date Indefinite    Indications   Atrial fibrillation with rapid ventricular response (H) (Resolved) [I48.91]  DVT (deep venous thrombosis) [I82.409]  Long term current use of anticoagulant therapy [Z79.01]         Anticoagulation Episode Summary     INR check location     Preferred lab     Send INR reminders to [facility]    Comments * Actual INR goal is 1.7-2.3  Taking Celebrex PRN         Anticoagulation Care Providers     Provider Role Specialty Phone number    Anjum Vidales MD [facility] [phone]            See the Encounter Report to view Anticoagulation Flowsheet and Dosing Calendar (Go to Encounters tab in chart review, and find the  Anticoagulation Therapy Visit)        Kylah Dorsey RN                22-Sep-2021 23:13

## 2021-09-22 NOTE — ED ADULT NURSE NOTE - OBJECTIVE STATEMENT
patient complains of difficulty breathing and mucous in her throat. Pt had covid previously. Patient has hx of anxiety. PAtient on cardiac monitor at bedside.

## 2021-09-22 NOTE — ED PROVIDER NOTE - OBJECTIVE STATEMENT
64yo F w/ PMH hypothyroid, HLD BIB daughter d/t complaint several weeks of thick mucus w/in throat, pt reports she feels unable to breath d/t mucus. Daughter reports pt w/o PO (solid or liquid) intake x 3 days. Pt attempting to cough up mucus w/o success, reports throat irritation. Daughter (RN) had been giving Mucinex / saline spray but pt declining all meds x 3 days. Daughter reports pt w/ hx severe anxiety / depression. Pt had COVID in July. + SOB. Denies F/C, headache, CP, cough, abd pain, back pain, N/V/D, UTI sx, LE pain / swelling.     PMH as above, PSH eye / knee, NKDA, meds as listed.

## 2021-09-22 NOTE — ED PROVIDER NOTE - PATIENT PORTAL LINK FT
You can access the FollowMyHealth Patient Portal offered by [facility] by registering at the following website: [email or web address]. By joining [employer]’s FollowMyHealth portal, you will also be able to view your health information using other applications (apps) compatible with our system.

## 2021-09-22 NOTE — ED PROVIDER NOTE - NSFOLLOWUPCLINICS_GEN_ALL_ED_FT
[facility]  Psychiatry  [address] [place] [address]  Phone: [phone]  Fax:   Follow Up Time: 7-10 Days

## 2021-09-22 NOTE — ED PROVIDER NOTE - PHYSICAL EXAMINATION
GEN: Awake, alert, interactive, NAD.  HEAD AND NECK: NC/AT. Airway patent. Neck supple.   EYES:  Clear b/l. EOMI. PERRL.   ENT: Moist mucus membranes. Oropharynx WNL.   CARDIAC: Regular rate, regular rhythm. No evident pedal edema.    RESP/CHEST: Normal respiratory effort with no use of accessory muscles or retractions. Clear throughout on auscultation.  ABD: Soft, non-distended, non-tender. No rebound, no guarding.   BACK: No midline spinal TTP. No CVAT.   EXTREMITIES: Moving all extremities with no apparent deformities.   SKIN: Warm, dry, intact normal color. No rash.   NEURO: AOx3, CN II-XII grossly intact, no focal deficits.   PSYCH: Appropriate mood and affect.
normal (ped)...

## 2021-09-24 ENCOUNTER — EMERGENCY (EMERGENCY)
Facility: HOSPITAL | Age: 66
LOS: 0 days | Discharge: HOME | End: 2021-09-24
Payer: MEDICARE

## 2021-09-24 VITALS
WEIGHT: 134.04 LBS | TEMPERATURE: 98 F | RESPIRATION RATE: 19 BRPM | OXYGEN SATURATION: 97 % | DIASTOLIC BLOOD PRESSURE: 87 MMHG | SYSTOLIC BLOOD PRESSURE: 119 MMHG | HEIGHT: 63 IN | HEART RATE: 91 BPM

## 2021-09-24 DIAGNOSIS — Z98.42 CATARACT EXTRACTION STATUS, LEFT EYE: ICD-10-CM

## 2021-09-24 DIAGNOSIS — Z98.42 CATARACT EXTRACTION STATUS, LEFT EYE: Chronic | ICD-10-CM

## 2021-09-24 DIAGNOSIS — Z98.89 OTHER SPECIFIED POSTPROCEDURAL STATES: Chronic | ICD-10-CM

## 2021-09-24 DIAGNOSIS — R09.89 OTHER SPECIFIED SYMPTOMS AND SIGNS INVOLVING THE CIRCULATORY AND RESPIRATORY SYSTEMS: ICD-10-CM

## 2021-09-24 DIAGNOSIS — Z98.41 CATARACT EXTRACTION STATUS, RIGHT EYE: Chronic | ICD-10-CM

## 2021-09-24 DIAGNOSIS — Z86.16 PERSONAL HISTORY OF COVID-19: ICD-10-CM

## 2021-09-24 DIAGNOSIS — K21.9 GASTRO-ESOPHAGEAL REFLUX DISEASE WITHOUT ESOPHAGITIS: ICD-10-CM

## 2021-09-24 DIAGNOSIS — E78.5 HYPERLIPIDEMIA, UNSPECIFIED: ICD-10-CM

## 2021-09-24 DIAGNOSIS — Z88.8 ALLERGY STATUS TO OTHER DRUGS, MEDICAMENTS AND BIOLOGICAL SUBSTANCES: ICD-10-CM

## 2021-09-24 DIAGNOSIS — Z98.891 HISTORY OF UTERINE SCAR FROM PREVIOUS SURGERY: ICD-10-CM

## 2021-09-24 DIAGNOSIS — E03.9 HYPOTHYROIDISM, UNSPECIFIED: ICD-10-CM

## 2021-09-24 PROCEDURE — 99283 EMERGENCY DEPT VISIT LOW MDM: CPT

## 2021-09-24 RX ORDER — FLUTICASONE PROPIONATE 50 MCG
50 SPRAY, SUSPENSION NASAL
Qty: 20 | Refills: 0
Start: 2021-09-24

## 2021-09-24 NOTE — ED PROVIDER NOTE - OBJECTIVE STATEMENT
65y Female with PMHx of HLD, GERD, covid 07/2021 presents to the ER for congestion. Patient reports congestion/mucus causing her difficulty to swallow/eat since she had covid in July. Seen in the ER earlier this week, all labs and CXR within normal limits. Discharge with mucinex and ativan for anxiety/depression. Daughter states they have been unable to  ativan as the pharmacy says they do not have it. Reports improvement of mucus intially upon discharge but now it is worse again. 65y Female with PMHx of HLD, GERD, covid 07/2021 presents to the ER for congestion. Patient reports congestion/mucus causing her difficulty to swallow/eat since she had covid in July. Seen in the ER earlier this week, all labs and CXR within normal limits. Discharge with mucinex and ativan for anxiety/depression. Daughter states they have been unable to  ativan as the pharmacy says they do not have it. Reports improvement of mucus intially upon discharge but now it is worse again. Denies fever, chills, difficulty breathing, chest pain, shortness of breath.

## 2021-09-24 NOTE — ED ADULT NURSE NOTE - OBJECTIVE STATEMENT
A&Ox4, ambulating. c/o unable to swallow food or fluids since Sunday/pt seen in ED last weds for same issue, d/c home with ativan and Mucinex, pt accompanied with daughter, daughter states mother with poor appetite, denies fevers/chills/n/v/sore throat.

## 2021-09-24 NOTE — ED ADULT NURSE NOTE - CHIEF COMPLAINT QUOTE
States mucus stuck in her throat, unable to swallow  Seen Wednesday for same issue  Speaking clearly in triage. Had covid 2 months ago

## 2021-09-24 NOTE — ED PROVIDER NOTE - PATIENT PORTAL LINK FT
You can access the FollowMyHealth Patient Portal offered by [facility] by registering at the following website: [email or web address]. By joining [employer]’s FollowMyHealth portal, you will also be able to view your health information using other applications (apps) compatible with our system.

## 2021-09-24 NOTE — ED PROVIDER NOTE - NSFOLLOWUPINSTRUCTIONS_ED_ALL_ED_FT
Today you were seen in the ER for congestion.     Use Flonase 2 sprays in each nostril as needed for congestion.     Take Mucinex as previously prescribed. Read all instructions and take as directed.     FOLLOW UP WITH ENT, GI AND [facility] WITHIN 48-72 HOURS. SEE BELOW FOR REFERRALS.     Advance activity as tolerated.     Continue all previously prescribed medications as directed unless otherwise instructed.     Follow up with your primary care physician in 48-72 hours- bring copies of your results.     Return to the ER for worsening or persistent symptoms, and/or ANY NEW OR CONCERNING SYMPTOMS including but not limited to fever, worsening cough/congestion, chest pain, shortness of breath or difficulty breathing.     If you have issues obtaining follow up, please call: [phone] to obtain a doctor or specialist who takes your insurance in your area.  You may call [phone] to make an appointment with the internal medicine clinic.

## 2021-09-24 NOTE — ED PROVIDER NOTE - CLINICAL SUMMARY MEDICAL DECISION MAKING FREE TEXT BOX
65y Female with PMHx of HLD, GERD, covid 07/2021 presents to the ER for congestion. Patient reports congestion/mucus causing her difficulty to swallow/eat since she had covid in July. Seen in the ER earlier this week, reports improvement of mucus intially upon discharge but now it is worse again. Denies fever, chills, difficulty breathing, chest pain, shortness of breath. Vital signs stable. Airway patent, lungs clear to auscultation bilaterally. Concern for viral illness - prescriptions canceled at [facility] in [facility] and resent to proper pharmacy. Will dc with ENT, GI and [facility] crisis center.

## 2021-09-24 NOTE — ED PROVIDER NOTE - NSFOLLOWUPCLINICS_GEN_ALL_ED_FT
[facility] - ENT  Otolaryngology (ENT)  [address] [place] [address]  Phone: [phone]  Fax:     Gastroenterology at [facility]  Gastroenterology  [address] [place] [address]  Phone: [phone]  Fax:     [facility] Gastroenterology  Gastroenterology  [address] [address]  Phone: [phone]  Fax:

## 2021-09-24 NOTE — ED ADULT TRIAGE NOTE - CHIEF COMPLAINT QUOTE
States mucus stuck in her throat, unable to swallow  Seen Wednesday for same issue  Speaking clearly in triage States mucus stuck in her throat, unable to swallow  Seen Wednesday for same issue  Speaking clearly in triage. Had covid 2 months ago

## 2021-09-24 NOTE — ED ADULT NURSE NOTE - NSICDXPASTMEDICALHX_GEN_ALL_CORE_FT
PAST MEDICAL HISTORY:  Cataract both eyes, being monitored by ophthalmologist.    GERD (gastroesophageal reflux disease)     Hyperlipemia     Hypothyroid     Pain epigastric, current problem    Syncopal episodes once in 1/2014, pt was evaluated by neuro and cardiac, negative finding, no recurence.    Vertigo

## 2021-09-24 NOTE — ED PROVIDER NOTE - NS ED ROS FT
Constitutional: (-) Fever, (-) Chills, (-) Anorexia  Skin: (-) Rashes  Eyes: (-) Visual changes, (-) Discharge, (-) Redness  Ears: (-) Hearing loss, (-)Tinnitus, (-) Ear pain  Nose: (+) Nasal congestion, (-) Runny nose  Mouth/Throat: (-) Sore throat  CV: (-) Chest pain, (-) Palpitations  Resp: (-) Cough, (-) Shortness of breath, (-) Dyspnea on Exertion, (-) Wheezing  GI: (-) Abdominal pain, (-) Nausea, (-) Vomiting  : (-) Dysuria  MSK: (-) Myalgias  Neuro:  (-) Headache

## 2021-09-29 PROBLEM — E03.9 HYPOTHYROIDISM, UNSPECIFIED: Chronic | Status: ACTIVE | Noted: 2021-09-24

## 2021-09-29 PROBLEM — E78.5 HYPERLIPIDEMIA, UNSPECIFIED: Chronic | Status: ACTIVE | Noted: 2021-09-24

## 2021-10-12 ENCOUNTER — APPOINTMENT (OUTPATIENT)
Dept: OTOLARYNGOLOGY | Facility: CLINIC | Age: 66
End: 2021-10-12

## 2021-10-18 ENCOUNTER — APPOINTMENT (OUTPATIENT)
Dept: OTOLARYNGOLOGY | Facility: CLINIC | Age: 66
End: 2021-10-18

## 2021-10-25 ENCOUNTER — TRANSCRIPTION ENCOUNTER (OUTPATIENT)
Age: 66
End: 2021-10-25

## 2021-10-27 ENCOUNTER — EMERGENCY (EMERGENCY)
Facility: HOSPITAL | Age: 66
LOS: 1 days | Discharge: HOME | End: 2021-10-27
Attending: STUDENT IN AN ORGANIZED HEALTH CARE EDUCATION/TRAINING PROGRAM
Payer: MEDICARE

## 2021-10-27 VITALS
TEMPERATURE: 98 F | SYSTOLIC BLOOD PRESSURE: 132 MMHG | RESPIRATION RATE: 20 BRPM | DIASTOLIC BLOOD PRESSURE: 90 MMHG | HEART RATE: 81 BPM | OXYGEN SATURATION: 100 %

## 2021-10-27 VITALS
SYSTOLIC BLOOD PRESSURE: 157 MMHG | HEART RATE: 109 BPM | RESPIRATION RATE: 20 BRPM | OXYGEN SATURATION: 99 % | TEMPERATURE: 98 F | WEIGHT: 130.07 LBS | HEIGHT: 63 IN | DIASTOLIC BLOOD PRESSURE: 102 MMHG

## 2021-10-27 DIAGNOSIS — Z98.89 OTHER SPECIFIED POSTPROCEDURAL STATES: Chronic | ICD-10-CM

## 2021-10-27 DIAGNOSIS — Z98.41 CATARACT EXTRACTION STATUS, RIGHT EYE: Chronic | ICD-10-CM

## 2021-10-27 DIAGNOSIS — Z98.42 CATARACT EXTRACTION STATUS, LEFT EYE: Chronic | ICD-10-CM

## 2021-10-27 LAB
ALBUMIN SERPL ELPH-MCNC: 4.5 G/DL — SIGNIFICANT CHANGE UP (ref 3.3–5)
ALP SERPL-CCNC: 91 U/L — SIGNIFICANT CHANGE UP (ref 40–120)
ALT FLD-CCNC: 23 U/L — SIGNIFICANT CHANGE UP (ref 10–45)
ANION GAP SERPL CALC-SCNC: 14 MMOL/L — SIGNIFICANT CHANGE UP (ref 5–17)
AST SERPL-CCNC: 27 U/L — SIGNIFICANT CHANGE UP (ref 10–40)
BILIRUB SERPL-MCNC: 1 MG/DL — SIGNIFICANT CHANGE UP (ref 0.2–1.2)
BUN SERPL-MCNC: 5 MG/DL — LOW (ref 7–23)
CALCIUM SERPL-MCNC: 9.5 MG/DL — SIGNIFICANT CHANGE UP (ref 8.4–10.5)
CHLORIDE SERPL-SCNC: 105 MMOL/L — SIGNIFICANT CHANGE UP (ref 96–108)
CO2 SERPL-SCNC: 24 MMOL/L — SIGNIFICANT CHANGE UP (ref 22–31)
CREAT SERPL-MCNC: 0.71 MG/DL — SIGNIFICANT CHANGE UP (ref 0.5–1.3)
GLUCOSE SERPL-MCNC: 98 MG/DL — SIGNIFICANT CHANGE UP (ref 70–99)
HCT VFR BLD CALC: 37.1 % — SIGNIFICANT CHANGE UP (ref 34.5–45)
HGB BLD-MCNC: 11.8 G/DL — SIGNIFICANT CHANGE UP (ref 11.5–15.5)
MCHC RBC-ENTMCNC: 27.3 PG — SIGNIFICANT CHANGE UP (ref 27–34)
MCHC RBC-ENTMCNC: 31.8 GM/DL — LOW (ref 32–36)
MCV RBC AUTO: 85.9 FL — SIGNIFICANT CHANGE UP (ref 80–100)
NRBC # BLD: 0 /100 WBCS — SIGNIFICANT CHANGE UP (ref 0–0)
PLATELET # BLD AUTO: 247 K/UL — SIGNIFICANT CHANGE UP (ref 150–400)
POTASSIUM SERPL-MCNC: 3.2 MMOL/L — LOW (ref 3.5–5.3)
POTASSIUM SERPL-SCNC: 3.2 MMOL/L — LOW (ref 3.5–5.3)
PROT SERPL-MCNC: 6.4 G/DL — SIGNIFICANT CHANGE UP (ref 6–8.3)
RBC # BLD: 4.32 M/UL — SIGNIFICANT CHANGE UP (ref 3.8–5.2)
RBC # FLD: 15.5 % — HIGH (ref 10.3–14.5)
SODIUM SERPL-SCNC: 143 MMOL/L — SIGNIFICANT CHANGE UP (ref 135–145)
TROPONIN T, HIGH SENSITIVITY RESULT: 7 NG/L — SIGNIFICANT CHANGE UP (ref 0–51)
TROPONIN T, HIGH SENSITIVITY RESULT: 8 NG/L — SIGNIFICANT CHANGE UP (ref 0–51)
TSH SERPL-MCNC: 1.75 UIU/ML — SIGNIFICANT CHANGE UP (ref 0.27–4.2)
WBC # BLD: 5.4 K/UL — SIGNIFICANT CHANGE UP (ref 3.8–10.5)
WBC # FLD AUTO: 5.4 K/UL — SIGNIFICANT CHANGE UP (ref 3.8–10.5)

## 2021-10-27 PROCEDURE — 85027 COMPLETE CBC AUTOMATED: CPT

## 2021-10-27 PROCEDURE — 84443 ASSAY THYROID STIM HORMONE: CPT

## 2021-10-27 PROCEDURE — 70491 CT SOFT TISSUE NECK W/DYE: CPT | Mod: MA

## 2021-10-27 PROCEDURE — 70491 CT SOFT TISSUE NECK W/DYE: CPT | Mod: 26,MA

## 2021-10-27 PROCEDURE — 80053 COMPREHEN METABOLIC PANEL: CPT

## 2021-10-27 PROCEDURE — 93010 ELECTROCARDIOGRAM REPORT: CPT

## 2021-10-27 PROCEDURE — 96374 THER/PROPH/DIAG INJ IV PUSH: CPT | Mod: XU

## 2021-10-27 PROCEDURE — 99285 EMERGENCY DEPT VISIT HI MDM: CPT

## 2021-10-27 PROCEDURE — 71046 X-RAY EXAM CHEST 2 VIEWS: CPT | Mod: 26

## 2021-10-27 PROCEDURE — 99284 EMERGENCY DEPT VISIT MOD MDM: CPT | Mod: 25

## 2021-10-27 PROCEDURE — 96376 TX/PRO/DX INJ SAME DRUG ADON: CPT | Mod: XU

## 2021-10-27 PROCEDURE — 96375 TX/PRO/DX INJ NEW DRUG ADDON: CPT | Mod: XU

## 2021-10-27 PROCEDURE — 93005 ELECTROCARDIOGRAM TRACING: CPT

## 2021-10-27 PROCEDURE — 71046 X-RAY EXAM CHEST 2 VIEWS: CPT

## 2021-10-27 PROCEDURE — 84484 ASSAY OF TROPONIN QUANT: CPT

## 2021-10-27 RX ORDER — POTASSIUM CHLORIDE 20 MEQ
20 PACKET (EA) ORAL ONCE
Refills: 0 | Status: DISCONTINUED | OUTPATIENT
Start: 2021-10-27 | End: 2021-10-27

## 2021-10-27 RX ORDER — POTASSIUM CHLORIDE 20 MEQ
20 PACKET (EA) ORAL ONCE
Refills: 0 | Status: COMPLETED | OUTPATIENT
Start: 2021-10-27 | End: 2021-10-27

## 2021-10-27 RX ORDER — POTASSIUM CHLORIDE 20 MEQ
10 PACKET (EA) ORAL
Refills: 0 | Status: COMPLETED | OUTPATIENT
Start: 2021-10-27 | End: 2021-10-27

## 2021-10-27 RX ORDER — FAMOTIDINE 10 MG/ML
20 INJECTION INTRAVENOUS ONCE
Refills: 0 | Status: COMPLETED | OUTPATIENT
Start: 2021-10-27 | End: 2021-10-27

## 2021-10-27 RX ORDER — POTASSIUM CHLORIDE 20 MEQ
40 PACKET (EA) ORAL ONCE
Refills: 0 | Status: COMPLETED | OUTPATIENT
Start: 2021-10-27 | End: 2021-10-27

## 2021-10-27 RX ORDER — SODIUM CHLORIDE 9 MG/ML
500 INJECTION INTRAMUSCULAR; INTRAVENOUS; SUBCUTANEOUS ONCE
Refills: 0 | Status: COMPLETED | OUTPATIENT
Start: 2021-10-27 | End: 2021-10-27

## 2021-10-27 RX ADMIN — SODIUM CHLORIDE 500 MILLILITER(S): 9 INJECTION INTRAMUSCULAR; INTRAVENOUS; SUBCUTANEOUS at 11:37

## 2021-10-27 RX ADMIN — Medication 30 MILLILITER(S): at 11:55

## 2021-10-27 RX ADMIN — Medication 100 MILLIEQUIVALENT(S): at 14:44

## 2021-10-27 RX ADMIN — Medication 100 MILLIEQUIVALENT(S): at 16:03

## 2021-10-27 RX ADMIN — FAMOTIDINE 20 MILLIGRAM(S): 10 INJECTION INTRAVENOUS at 11:56

## 2021-10-27 RX ADMIN — Medication 20 MILLIEQUIVALENT(S): at 16:53

## 2021-10-27 NOTE — ED PROVIDER NOTE - PROGRESS NOTE DETAILS
Patient still complaining of difficulty swallowing water. States that the lump in her throat "comes and goes". Reviewed labs and CT scan results with patient. Patient receiving K+ through IV. Patient states that she has endoscopy set up with her GI doctor this friday. States that she has appointment for barium swallow in November. Joyce Tong PA-C Patient reassessed. Able to swallow water. Patient receiving IV potassium. Patient pending repeat troponin. Joyce Tong PA-C Patient able to tolerate crackers and water. Patient is comfortable going home and will go to her endoscopy appointment on friday. Discussed this with . He agrees with plan an is comfortable taking her home. ED attending agrees with plan for discharge home. Joyce Tong PA-C

## 2021-10-27 NOTE — ED PROVIDER NOTE - OBJECTIVE STATEMENT
66 y/o female with PMHx of HLD, Hypothyroidism, psychiatric disorder presents to the ED complaining of feeing like mucus is stuck in her throat x 1 week. Patient states that she feels like she is having difficulty eating and drinking due to these symptoms. States that she has only been able to have very small amounts of food or drink for past week. She also is complaining of feeling short of breath due to these symptoms. Patient states that she saw her doctor a few days ago and was prescribed Mucinex which has not helped her symptoms. Patient was discharged from the emergency department on 10/12/21 in [place] because she was "hearing voices". Patient was discharged on Haldol and Cogentin. She states that she has been taking these medications as directed since discharge. Patient denies any recent illness, headache, fever, chills, chest pain, palpitations, sore throat, abdominal pain, n/v/d. Patient has never had symptoms like this before.

## 2021-10-27 NOTE — ED PROVIDER NOTE - PATIENT PORTAL LINK FT
You can access the FollowMyHealth Patient Portal offered by [facility] by registering at the following website: [email or web address]. By joining [employer]’s FollowMyHealth portal, you will also be able to view your health information using other applications (apps) compatible with our system.

## 2021-10-27 NOTE — ED PROVIDER NOTE - NSFOLLOWUPINSTRUCTIONS_ED_ALL_ED_FT
1. Follow up with your Primary care doctor within 2-3 days.   2. Keep your endoscopy appointment in 2 days with gastroenterologist   3. Follow up with your ENT doctor within 1 week   4. Drink small sips of water throughout the day   5. Return to the emergency department as needed if you develop worsening difficulty swallowing, trouble breathing, worsening pain, chest pain, fever, chills, inability to eat or drink or any other worsening symptoms.

## 2021-10-27 NOTE — ED ADULT NURSE REASSESSMENT NOTE - NS ED NURSE REASSESS COMMENT FT1
Pt. denies any pain or discomfort at this time. pt states the feeling in her throat remains the same.

## 2021-10-27 NOTE — ED ADULT NURSE NOTE - OBJECTIVE STATEMENT
65 y.o female A&Ox3. PMH hyperlipidemia, hypothyroid, cataract, GERD. Pt came to ED complaining of mucus stuck in the back of her throat that makes her short of breath. Pt states she took mucinex but it did not help to relieve the mucus. Pt states this has been on going since last wednesday. Pt states she does not have a cough and has not been able to spilt up mucus. pt denies headaches, fever and chills. pt denies urinary symptoms. Lung sounds are clear bilaterally. IV placed in left arm. safety and comfort provided.

## 2021-10-27 NOTE — ED ADULT NURSE REASSESSMENT NOTE - NS ED NURSE REASSESS COMMENT FT1
As per MD, do not administer last potassium chloride IVPB. K zane solution to be administered instead. Pt. safe to be discharged home with  at this time.

## 2021-10-27 NOTE — ED PROVIDER NOTE - CARE PLAN
Denies known Latex allergy or symptoms of Latex sensitivity.  Medications reviewed and updated.  History   Smoking Status   • Former Smoker   Smokeless Tobacco   • Never Used     Patient presents to [facility] In [facility] with complaints of elevated bp for the past week.  States blood pressure has been all over the place this past week.  Seems to be elevated more at night time.  Last night was 160/110 and 140/99 at 11 am this morning.  Has been taking 81 mg aspirin for the past week to see if it will help regulate bp.     1 Principal Discharge DX:	Difficulty swallowing

## 2021-10-27 NOTE — ED ADULT NURSE REASSESSMENT NOTE - NS ED NURSE REASSESS COMMENT FT1
Pt. states the mucus in her throat has gotten worse. Pt. failed dysphagia screening, states she feels like she is choking when swallowing water.

## 2021-10-27 NOTE — ED PROVIDER NOTE - PROGRESS NOTE ADDITIONAL1
Date of Service: 11/20/2020    PREOPERATIVE DIAGNOSES:  1.  Peripheral arterial disease.  2.  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      PROCEDURE:    1.  Resection of the superficial femoral artery aneurysm.  2.  Right femoral popliteal bypass with vein/vein composite with 2 separate harvesting sites.  3.  Left leg VasoView vein harvest.    SURGEON:  Alex Garsia MD.    FIRST ASSISTANT:  Kai Arrington PA-C.    ASSISTANT:  Macrina Franz SA.    TYPE OF ANESTHESIA:  General.    ANESTHESIOLOGIST:  Bakari Scott MD.    DESCRIPTION OF OPERATIVE PROCEDURE:  The patient was taken to the operating room and placed on the operating room table in the supine position.  The right lower extremity was ischemic and we were planning a femoral popliteal bypass.  Vein was harvested from the left leg using a VasoView technique.  We planned to use that vein for the right femoral popliteal bypass.  An incision was made in the right femoral area, carried down through skin and subcutaneous tissues.  Common femoral artery was circumferentially dissected as was the superficial femoral and profunda femoris.  The superficial femoral artery was occluded.  I then made an incision above the knee because on the angiogram the popliteal artery above the knee was patent.  Upon dissecting out the popliteal artery above the knee and the distal superficial femoral artery, it was clear there was a large aneurysm and this artery had suffered an interval occlusion.  Therefore, this could not be used as a target vessel.  Given the complete occlusion from thrombosis of the superficial femoral artery aneurysm, any type of distal revascularization would have to be proceeded by resection of the aneurysm.  Therefore, we circumferentially dissected the superficial femoral artery at the adductor hiatus.  I performed a resection of the superficial femoral artery aneurysm and ligated and oversewed the popliteal artery at the level  of the knee and the superficial femoral artery at the level of the adductor hiatus.  This was done with 4-0 Prolene suture.  The entire aneurysm of the superficial femoral artery was excised.  After successful aneurysm resection, we turned our attention to revascularization.  Given that the above-knee artery was not a target, I made an incision below the knee, carried it down through skin and subcutaneous tissues and I dissected the popliteal artery using a standard infragenicular approach.  Vessel loops were placed around the popliteal artery.  It was somewhat diseased, but patent.  A segment of saphenous vein was then harvested from the right thigh.  We therefore had 2 segments of saphenous vein harvested from 2 separate locations and I was going to perform a composite vein.  Therefore, I performed a venovenostomy using 6-0 Prolene suture.  We now had vein length to reach from the common femoral artery to the popliteal artery just before the tibioperoneal trunk.  The patient was systemically heparinized and the anastomosis was performed of the common femoral artery using 6-0 Prolene suture in a standard running fashion.  There was good flow through the vein bypass, which was then tunneled in an anatomic plane between the 2 heads of the gastrocnemius muscle and a subsartorial plane bringing the vein/vein composite bypass all the way down to the tibioperoneal trunk.  I then used a tourniquet for distal vascular control, made an arteriotomy in the infragenicular popliteal artery and extended this with Peterson-Gonzales scissors.  The vein was sewn to the distal popliteal artery with 6-0 Prolene suture in a standard running fashion.  At the conclusion, there was a good pulse in the posterior tibial at the ankle.  The posterior tibial was the only runoff vessel.  There was good hemostasis, good diastolic flow through the vein bypass.  Heparin effect was reversed with Protamine.  Subcutaneous tissue closed with Vicryl, skin  was closed with Monocryl.  The patient tolerated the procedure well.        Dictated By: Alex Garsia MD  Signing Provider: MD DARYL Rangel/james ([id number])  DD: 11/20/2020 11:29:20 TD: 11/20/2020 11:40:42    Copy Sent To:    Additional Progress Note...

## 2021-10-27 NOTE — ED PROVIDER NOTE - PHYSICAL EXAMINATION
CONSTITUTIONAL: Patient is awake, alert and oriented x 3. Patient is well appearing; patient is in mild distress;   HEAD: NCAT  ENT: Airway patent, Nasal mucosa clear. Mouth with dry oral mucosa. Throat has no vesicles, no oropharyngeal exudates and uvula is midline; mild tonsillar erythema b/l;   NECK: Supple, No LAD,  LUNGS: CTA B/L, no wheezes, rhonci or rales  HEART: RRR.+S1S2 no murmurs,   ABDOMEN: Soft, non-tender to palpation throughout all four quadrants, non-distended,  no rebound tenderness to palpation, no guarding,    EXTREMITY: No edema or calf tenderness b/l, FROM upper and lower ext b/l,   SKIN: No rash or lesions  NEURO: No focal deficits CONSTITUTIONAL: Patient is awake, alert and oriented x 3. Patient is well appearing; patient is in mild distress;   HEAD: NCAT  ENT: Airway patent, Nasal mucosa clear. Mouth with dry oral mucosa. Throat has no vesicles, no oropharyngeal exudates and uvula is midline; mild tonsillar erythema b/l;   NECK: Supple, No LAD,  LUNGS: CTA B/L, no wheezes, rhonci or rales  HEART: Tachycardic; regular rhythm; +S1S2 no murmurs,   ABDOMEN: Soft, non-tender to palpation throughout all four quadrants, non-distended,  no rebound tenderness to palpation, no guarding,    EXTREMITY: No edema or calf tenderness b/l, FROM upper and lower ext b/l,   SKIN: No rash or lesions  NEURO: No focal deficits

## 2021-11-01 ENCOUNTER — APPOINTMENT (OUTPATIENT)
Dept: OTOLARYNGOLOGY | Facility: CLINIC | Age: 66
End: 2021-11-01

## 2021-11-14 ENCOUNTER — TRANSCRIPTION ENCOUNTER (OUTPATIENT)
Age: 66
End: 2021-11-14

## 2021-11-19 NOTE — DISCHARGE NOTE PROVIDER - NSDCADMDATE_GEN_ALL_CORE_FT
No pt identifier on VM. Did not leave a detailed message. Return # [phone]  Emailed with address provided to the COVID screening line.       Calling to notify the pt. of their  NEGATIVE COVID-19 test results.     With respect to COVID-19 monitoring, the Pt. is cleared to return to work on next scheduled shift.      16-Aug-2021 03:12

## 2021-12-06 ENCOUNTER — TRANSCRIPTION ENCOUNTER (OUTPATIENT)
Age: 66
End: 2021-12-06

## 2021-12-07 ENCOUNTER — APPOINTMENT (OUTPATIENT)
Dept: CARDIOLOGY | Facility: CLINIC | Age: 66
End: 2021-12-07
Payer: MEDICARE

## 2021-12-07 ENCOUNTER — NON-APPOINTMENT (OUTPATIENT)
Age: 66
End: 2021-12-07

## 2021-12-07 VITALS
HEART RATE: 74 BPM | BODY MASS INDEX: 22.5 KG/M2 | SYSTOLIC BLOOD PRESSURE: 137 MMHG | WEIGHT: 127 LBS | HEIGHT: 63 IN | OXYGEN SATURATION: 100 % | DIASTOLIC BLOOD PRESSURE: 85 MMHG

## 2021-12-07 DIAGNOSIS — J96.01 ACUTE RESPIRATORY FAILURE WITH HYPOXIA: ICD-10-CM

## 2021-12-07 DIAGNOSIS — R55 SYNCOPE AND COLLAPSE: ICD-10-CM

## 2021-12-07 DIAGNOSIS — Z83.3 FAMILY HISTORY OF DIABETES MELLITUS: ICD-10-CM

## 2021-12-07 DIAGNOSIS — Z87.898 PERSONAL HISTORY OF OTHER SPECIFIED CONDITIONS: ICD-10-CM

## 2021-12-07 DIAGNOSIS — R42 DIZZINESS AND GIDDINESS: ICD-10-CM

## 2021-12-07 DIAGNOSIS — Z87.01 PERSONAL HISTORY OF PNEUMONIA (RECURRENT): ICD-10-CM

## 2021-12-07 DIAGNOSIS — E78.5 HYPERLIPIDEMIA, UNSPECIFIED: ICD-10-CM

## 2021-12-07 DIAGNOSIS — E03.9 HYPOTHYROIDISM, UNSPECIFIED: ICD-10-CM

## 2021-12-07 DIAGNOSIS — K21.9 GASTRO-ESOPHAGEAL REFLUX DISEASE W/OUT ESOPHAGITIS: ICD-10-CM

## 2021-12-07 DIAGNOSIS — U07.1 COVID-19: ICD-10-CM

## 2021-12-07 DIAGNOSIS — R00.2 PALPITATIONS: ICD-10-CM

## 2021-12-07 PROCEDURE — 99204 OFFICE O/P NEW MOD 45 MIN: CPT

## 2021-12-07 PROCEDURE — 93000 ELECTROCARDIOGRAM COMPLETE: CPT

## 2021-12-14 ENCOUNTER — APPOINTMENT (OUTPATIENT)
Dept: CV DIAGNOSITCS | Facility: HOSPITAL | Age: 66
End: 2021-12-14
Payer: MEDICARE

## 2021-12-14 ENCOUNTER — OUTPATIENT (OUTPATIENT)
Dept: OUTPATIENT SERVICES | Facility: HOSPITAL | Age: 66
LOS: 1 days | End: 2021-12-14

## 2021-12-14 DIAGNOSIS — Z87.898 PERSONAL HISTORY OF OTHER SPECIFIED CONDITIONS: ICD-10-CM

## 2021-12-14 DIAGNOSIS — Z98.89 OTHER SPECIFIED POSTPROCEDURAL STATES: Chronic | ICD-10-CM

## 2021-12-14 DIAGNOSIS — Z98.42 CATARACT EXTRACTION STATUS, LEFT EYE: Chronic | ICD-10-CM

## 2021-12-14 DIAGNOSIS — Z98.41 CATARACT EXTRACTION STATUS, RIGHT EYE: Chronic | ICD-10-CM

## 2021-12-14 PROCEDURE — 93306 TTE W/DOPPLER COMPLETE: CPT | Mod: 26

## 2021-12-16 ENCOUNTER — TRANSCRIPTION ENCOUNTER (OUTPATIENT)
Age: 66
End: 2021-12-16

## 2021-12-31 RX ORDER — LEVOTHYROXINE SODIUM 0.09 MG/1
88 TABLET ORAL DAILY
Qty: 30 | Refills: 0 | Status: ACTIVE | COMMUNITY
Start: 2021-12-31

## 2021-12-31 RX ORDER — OMEPRAZOLE 20 MG/1
20 TABLET, DELAYED RELEASE ORAL
Qty: 30 | Refills: 5 | Status: ACTIVE | COMMUNITY
Start: 2021-12-31

## 2021-12-31 RX ORDER — SIMVASTATIN 10 MG/1
10 TABLET, FILM COATED ORAL
Qty: 30 | Refills: 3 | Status: ACTIVE | COMMUNITY
Start: 2021-12-31

## 2021-12-31 NOTE — REASON FOR VISIT
[FreeTextEntry1] : Ms. Loving is a 67 yo F with PMH of Hypothyroidism, HLD, ? dementia presenting for initial evaluation. She had an episode of shortness of breath, mucus build up in her throat and went to the ER. She was discharged for further follow up. In August, she spent a week hospitalized for COVID pneumonia and received Remdesivir. She had an episode of a fluttering sensation in her throat for a few minutes while at rest. She feels tired when walking but denies chest pain. \par \par

## 2021-12-31 NOTE — ASSESSMENT
[FreeTextEntry1] : 65 yo F with HLD, Hypothyroidism, ? Dementia presenting with palpitations, SORIA\par \par \par ---In light of recent COVID infection, will obtain Echo, fluttering in throat is concerning for palpitations, will obtain event monitor\par ---If workup is negative and symptoms persist, will consider ischemic evaluation\par ---Will obtain recent bloodwork from PCP

## 2021-12-31 NOTE — REVIEW OF SYSTEMS
[Feeling Fatigued] : feeling fatigued [Palpitations] : palpitations [SOB] : shortness of breath [Negative] : Heme/Lymph

## 2022-01-04 ENCOUNTER — APPOINTMENT (OUTPATIENT)
Dept: PSYCHIATRY | Facility: CLINIC | Age: 67
End: 2022-01-04
Payer: MEDICARE

## 2022-01-04 DIAGNOSIS — F41.9 ANXIETY DISORDER, UNSPECIFIED: ICD-10-CM

## 2022-01-04 PROCEDURE — 99205 OFFICE O/P NEW HI 60 MIN: CPT

## 2022-02-01 ENCOUNTER — APPOINTMENT (OUTPATIENT)
Dept: PSYCHIATRY | Facility: CLINIC | Age: 67
End: 2022-02-01
Payer: MEDICARE

## 2022-02-01 PROCEDURE — 99214 OFFICE O/P EST MOD 30 MIN: CPT

## 2022-03-02 ENCOUNTER — APPOINTMENT (OUTPATIENT)
Dept: PSYCHIATRY | Facility: CLINIC | Age: 67
End: 2022-03-02
Payer: MEDICARE

## 2022-03-02 PROCEDURE — 99214 OFFICE O/P EST MOD 30 MIN: CPT

## 2022-03-02 RX ORDER — ESCITALOPRAM OXALATE 5 MG/1
5 TABLET ORAL
Qty: 30 | Refills: 0 | Status: COMPLETED | COMMUNITY
Start: 2022-01-04 | End: 2022-03-02

## 2022-04-05 ENCOUNTER — APPOINTMENT (OUTPATIENT)
Dept: CARDIOLOGY | Facility: CLINIC | Age: 67
End: 2022-04-05

## 2022-06-21 ENCOUNTER — APPOINTMENT (OUTPATIENT)
Dept: PSYCHIATRY | Facility: CLINIC | Age: 67
End: 2022-06-21
Payer: MEDICARE

## 2022-06-21 PROCEDURE — 99214 OFFICE O/P EST MOD 30 MIN: CPT

## 2022-07-26 ENCOUNTER — APPOINTMENT (OUTPATIENT)
Dept: ORTHOPEDIC SURGERY | Facility: CLINIC | Age: 67
End: 2022-07-26

## 2022-07-26 VITALS — BODY MASS INDEX: 28.7 KG/M2 | HEIGHT: 63 IN | WEIGHT: 162 LBS

## 2022-07-26 DIAGNOSIS — S49.80XA OTHER SPECIFIED INJURIES OF SHOULDER AND UPPER ARM, UNSPECIFIED ARM, INITIAL ENCOUNTER: ICD-10-CM

## 2022-07-26 DIAGNOSIS — T50.Z95A OTHER SPECIFIED INJURIES OF SHOULDER AND UPPER ARM, UNSPECIFIED ARM, INITIAL ENCOUNTER: ICD-10-CM

## 2022-07-26 PROCEDURE — 99203 OFFICE O/P NEW LOW 30 MIN: CPT

## 2022-07-26 PROCEDURE — 73030 X-RAY EXAM OF SHOULDER: CPT | Mod: LT

## 2022-07-26 RX ORDER — LEVOTHYROXINE SODIUM 137 UG/1
TABLET ORAL
Refills: 0 | Status: ACTIVE | COMMUNITY

## 2022-07-26 NOTE — IMAGING
[de-identified] : Left shoulder:\par internal rotation to L5\par abduction to 50\par No ecchymosis/swelling/bony deformity\par NVID\par

## 2022-07-26 NOTE — REASON FOR VISIT
[FreeTextEntry2] : New patient visit is here for left arm pain, onset December 2021. Left arm swelling, onset June 2022.

## 2022-07-26 NOTE — HISTORY OF PRESENT ILLNESS
[Gradual] : gradual [9] : 9 [Retired] : Work status: retired [de-identified] : 7/26/22:  Pt has had swelling and pain in left shoulder. It started in November after 1st COVID vaccine and got worse after 2nd vaccine in December.  Pt has not been working since.  Pt has not tried NSAIDs.\par \par RHD [] : no [FreeTextEntry1] : Left arm [FreeTextEntry3] : 12/2021 [FreeTextEntry5] : Patient began to experience pain starting in December 2021 and swelling in the left arm starting one month ago. [FreeTextEntry7] : Down the left arm [de-identified] : 2 week ago [de-identified] : PCP

## 2022-07-26 NOTE — ASSESSMENT
[FreeTextEntry1] : The patient was advised of the diagnosis. The natural history of the pathology was explained in full to the patient in layman's terms. All questions were answered. The risks and benefits of surgical and non-surgical treatment alternatives were explained in full to the patient.\par NSAIDs recommended.  Patient warned of risk of NSAID medication to stomach and GI tract, risk of increase blood pressure, cardiac risk, and risk of fluid retention.  The patient should clear taking medication with internist/PMD if any problem with heart, blood pressure, or GI system exists.\par MRI to eval for inflammation\par F/u after MRI\par Start PT

## 2022-07-28 ENCOUNTER — FORM ENCOUNTER (OUTPATIENT)
Age: 67
End: 2022-07-28

## 2022-07-29 ENCOUNTER — APPOINTMENT (OUTPATIENT)
Dept: MRI IMAGING | Facility: CLINIC | Age: 67
End: 2022-07-29

## 2022-07-29 PROCEDURE — 73221 MRI JOINT UPR EXTREM W/O DYE: CPT | Mod: LT

## 2022-08-02 ENCOUNTER — APPOINTMENT (OUTPATIENT)
Dept: ORTHOPEDIC SURGERY | Facility: CLINIC | Age: 67
End: 2022-08-02

## 2022-08-02 VITALS — WEIGHT: 162 LBS | BODY MASS INDEX: 28.7 KG/M2 | HEIGHT: 63 IN

## 2022-08-02 PROCEDURE — J3490M: CUSTOM

## 2022-08-02 PROCEDURE — 20610 DRAIN/INJ JOINT/BURSA W/O US: CPT | Mod: LT

## 2022-08-02 PROCEDURE — 99214 OFFICE O/P EST MOD 30 MIN: CPT | Mod: 25

## 2022-08-02 NOTE — PROCEDURE
[Large Joint Injection] : Large joint injection [Shoulder] : shoulder [Pain] : pain [Inflammation] : inflammation [Alcohol] : alcohol [Ethyl Chloride sprayed topically] : ethyl chloride sprayed topically [Sterile technique used] : sterile technique used [___ cc    1%] : Lidocaine ~Vcc of 1%  [___ cc    0.25%] : Bupivacaine (Marcaine) ~Vcc of 0.25%  [___ cc    40mg] : Triamcinolone (Kenalog) ~Vcc of 40 mg  [] : Patient tolerated procedure well [Call if redness, pain or fever occur] : call if redness, pain or fever occur [Apply ice for 15min out of every hour for the next 12-24 hours as tolerated] : apply ice for 15 minutes out of every hour for the next 12-24 hours as tolerated [Previous OTC use and PT nontherapeutic] : patient has tried OTC's including aspirin, Ibuprofen, Aleve, etc or prescription NSAIDS, and/or exercises at home and/or physical therapy without satisfactory response [Patient had decreased mobility in the joint] : patient had decreased mobility in the joint [Risks, benefits, alternatives discussed / Verbal consent obtained] : the risks benefits, and alternatives have been discussed, and verbal consent was obtained [de-identified] : left shoulder csi #1

## 2022-08-02 NOTE — IMAGING
[de-identified] : Left shoulder:\par internal rotation to L5\par abduction to 50\par No ecchymosis/swelling/bony deformity\par NVID\par

## 2022-08-02 NOTE — ASSESSMENT
[FreeTextEntry1] : The patient was advised of the diagnosis. The natural history of the pathology was explained in full to the patient in layman's terms. All questions were answered. The risks and benefits of surgical and non-surgical treatment alternatives were explained in full to the patient.\par NSAIDs recommended.  Patient warned of risk of NSAID medication to stomach and GI tract, risk of increase blood pressure, cardiac risk, and risk of fluid retention.  The patient should clear taking medication with internist/PMD if any problem with heart, blood pressure, or GI system exists.\par \par Pt provided left shoulder subacromial and GH CSI today.\par Pt will begin PT x 6 weeks.\par RTO in 6 weeks for f/u care.\par \par

## 2022-08-02 NOTE — HISTORY OF PRESENT ILLNESS
[9] : 9 [Retired] : Work status: retired [Gradual] : gradual [de-identified] : 8/2/2022: Pt here s/p left shoulder MRI which shows:\par 1. Moderate rotator cuff tendinopathy, biceps tenosynovitis, and glenohumeral joint capsulitis with effusion and \par bursitis without rotator cuff tear, acute osseous injury or muscle atrophy.\par 2. AC joint arthrosis.\par Pt has noted no improvement as to ROM or strength.\par \par 7/26/22:  Pt has had swelling and pain in left shoulder. It started in November after 1st COVID vaccine and got worse after 2nd vaccine in December.  Pt has not been working since.  Pt has not tried NSAIDs.\par \par RHD [] : no [FreeTextEntry1] : Left arm [FreeTextEntry5] : Patient began to experience pain starting in December 2021 and swelling in the left arm starting one month ago. [FreeTextEntry3] : 12/2021 [FreeTextEntry7] : Down the left arm [de-identified] : 2 week ago [de-identified] : PCP [de-identified] : Patient has not started PT.

## 2022-09-13 ENCOUNTER — APPOINTMENT (OUTPATIENT)
Dept: ORTHOPEDIC SURGERY | Facility: CLINIC | Age: 67
End: 2022-09-13

## 2022-09-13 ENCOUNTER — APPOINTMENT (OUTPATIENT)
Dept: PSYCHIATRY | Facility: CLINIC | Age: 67
End: 2022-09-13

## 2022-09-19 NOTE — H&P ADULT - ASSESSMENT
65y F w/ PMHx Vertigo on Meclizine, GERD p/w COVID      # Acute Hypoxic respiratory Failure   # COVID Pneumonia     IV Decadron, Remdesivir.   ID consult called.   Monitor serum markers.  Holding RN to OR RN

## 2022-09-20 ENCOUNTER — APPOINTMENT (OUTPATIENT)
Dept: CARDIOLOGY | Facility: CLINIC | Age: 67
End: 2022-09-20

## 2022-09-20 VITALS — BODY MASS INDEX: 28.34 KG/M2 | WEIGHT: 160 LBS

## 2022-09-20 VITALS
SYSTOLIC BLOOD PRESSURE: 133 MMHG | DIASTOLIC BLOOD PRESSURE: 83 MMHG | OXYGEN SATURATION: 97 % | BODY MASS INDEX: 28.34 KG/M2 | HEIGHT: 63 IN | HEART RATE: 72 BPM | TEMPERATURE: 98.5 F

## 2022-09-20 DIAGNOSIS — R06.00 DYSPNEA, UNSPECIFIED: ICD-10-CM

## 2022-09-20 PROCEDURE — 99214 OFFICE O/P EST MOD 30 MIN: CPT | Mod: 25

## 2022-09-20 PROCEDURE — 93000 ELECTROCARDIOGRAM COMPLETE: CPT

## 2022-10-07 ENCOUNTER — APPOINTMENT (OUTPATIENT)
Dept: PSYCHIATRY | Facility: CLINIC | Age: 67
End: 2022-10-07

## 2022-10-07 PROCEDURE — 99214 OFFICE O/P EST MOD 30 MIN: CPT

## 2022-10-07 RX ORDER — HALOPERIDOL 5 MG/1
5 TABLET ORAL
Qty: 90 | Refills: 0 | Status: COMPLETED | COMMUNITY
Start: 2021-12-31 | End: 2022-10-07

## 2022-10-07 RX ORDER — HYDROXYZINE PAMOATE 100 MG/1
100 CAPSULE ORAL AT BEDTIME
Qty: 30 | Refills: 0 | Status: COMPLETED | COMMUNITY
Start: 2022-03-02 | End: 2022-10-07

## 2022-10-07 RX ORDER — BENZTROPINE MESYLATE 0.5 MG/1
0.5 TABLET ORAL
Qty: 30 | Refills: 0 | Status: COMPLETED | COMMUNITY
Start: 2022-03-02 | End: 2022-10-07

## 2022-10-07 NOTE — HISTORY OF PRESENT ILLNESS
[de-identified] : Patient is here in the office for face to face interview with writer for 3 month follow-up visit.\par \par Patient is accompanied by her  Neptali. No medication changes 3 months ago. Mood is very good. Less depression and anxiety. Sleeping 8 hrs per night with the Mirtazapine 30 mg. States she shetty snot take it everyday. Appetite is increased because of the Mirtazapine. Energy, concentration and motivation have improved. Denies any more AVH, SI or HI. or paranoia. No Klonopin this month. States she stopped taking the Haldol and she doesn't have any paranoia. Only on Lexapro 20 anmd Mirtazpaine 30 mg.  [de-identified] : Patient is here for in the office for face to face interview for initial psychiatric evaluation. \par \par ID: Patient is a 65 yo AA female, , retired, domicile living with  and her daughters seen today for psychiatric evaluation and medication management.\par \par HPI: Patient is accompanied by her  Neptali. Patient's daughter was also put on speaker fro collateral information. States that patient had COVID in July 2021 and since then she has not been able to sit still. 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 States she was hearing voices telling her to walk up and down the stairs and to not eat. +Paranoia that people were looking at her and out to get her." States she had trouble sleeping, not able to sit still, states she was anxious, and when she went for walks she would be wandering fro 3 hrs or so and they couldn't find her. \par \par As per the : Prior to getting COVID she was fine. No anxiety no psychosis. He also feels all these symptoms are a sequela of getting COVID. She is pacing back and forth, does not want to be alone. Decrease appetite as she is complaining of of "something being stuck in her throat." Went to ENT and all tests came back negative. He states she is speaking slower now. "This is not how she was speaking before all this." Currently on Haldol 5 mg PO BID, Cogentin 0.5 mg PO QHS, and Mirtazapine 30 mg PO QHS, Hydroxyzine 100 mg PO PRN\par \par Depression: denies any low mood but has anhedonia\par \par Anxiety: Endorses to anxiety in the form of worrying, rumination, panic like symptoms (Last attack cant remember: experiences palpitations and feeling of something stuck in her throat.") Somatization (something "stuck in her throat.")\par \par Sleep: 6 hrs full with Mirtazapine \par \par Appetite: better with the Mirtazapine but states due to this "feel something is stuck in throat" appetite is decreased. \par \par Energy, concentration and motivation are all decreased. \par \par Currently denies any AVH, SI or HI with the help of the medications. Denies any paranoia. \par \par Hypomanic symptoms: denies\par \par Substance use hx:\par denies any substance use

## 2022-10-07 NOTE — SOCIAL HISTORY
[Lives with Spouse] : lives with spouse [Retired From Work] : retired from work [] :  [# Of Children ___] : has [unfilled] children [Less Than High School] : less than high school [None] : none [FreeTextEntry1] : Born: [place] and came [place]\par Siblings: 5 sisters and 1 brother\par Parents: passed away. 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 \par /Kids  for 45 years. Has 3 daughters\par Abuse denies\par Legal: Denies\par

## 2022-10-07 NOTE — PHYSICAL EXAM
[None] : none [Slowed] : slowed [Anxious] : anxious [Constricted] : constricted [Normal] : alert, oriented to person, place, and time [Fair] : fair [FreeTextEntry8] : "I'm anxious." better [FreeTextEntry9] : better

## 2022-10-07 NOTE — DISCUSSION/SUMMARY
[FreeTextEntry1] : Assessment: Patient is a 65 yo AA female with h/o psychosis, depression and anxiety seen today for medication management. . Patient is compliant with medications and tolerating without any new reported side effects. I-stop reviewed and no issues noticed.\par \par I-STOP:\par Patient Name: Ana Lilia Loving \par YOB: 1955 \par Address: [address] [place] [address] \par Sex: Female \par Rx Written	Rx Dispensed	Drug	Quantity	Days Supply	Prescriber Name	Prescriber Ruth #	Payment Method	Dispenser\par 09/24/2021	09/24/2021	lorazepam 1 mg tablet 	5	3	Heidi Garces (M S , PA-C)	[id number]	Medicare	[facility] [id number]\par \par \par PLAN:\par Continue Lexapro 20 mg PO QAM for depression and anxiety\par Continue Klonopin 0.5 mg PO PRN for anxiety. #10\par Continue Hydroxyzine 100 mg PO PRN for anxiety and insomnia\par Continue Mirtazapine 30 mg PO QHS for depression, insomnia and increase appetite. \par D/C Haldol 5 mg PO BID fro psychosis\par D/C Benztropine 0.5 mg PO QHS for EPS\par - Discussed risks and benefits of medications including side effects of GI and sexual side effects with SSRI, EPS with Haldol. Alternative strategies including no intervention discussed with patient. Patient consents to current medications as prescribed.\par - Discussed with patient regarding importance of abstinence and sobriety from alcohol and drugs. Educated about relationship between worsening mood/anxiety symptoms and drug use and improvement of symptoms with abstinence. \par - Discussed about unpredictable effects including cardiorespiratory collapse from the combination of illicit drugs and prescribed medications. Patient verbalized understanding.\par - Patient understands to contact clinic prn with concerns and agrees to call 911 or go to nearest ER if symptoms worsen.\par - Next appointment made in 3 month. Patient left the office without any distress.\par

## 2022-10-07 NOTE — CURRENT PSYCHIATRIC SYMPTOMS
[Anhedonia] : anhedonia [Decreased Concentration] : decreased concentrating ability [Delusions] : ~T delusions [Hallucination Auditory] : auditory hallucinations [Excessive Worry] : excessive worry [Ruminations] : ruminations [Hypochondriasis] : hypochondriasis [Panic] : panic  [de-identified] : better [de-identified] : denies [de-identified] : better [de-identified] : better [de-identified] : denies [de-identified] : denies [de-identified] : denies

## 2022-10-07 NOTE — PAST MEDICAL HISTORY
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

## 2022-10-13 ENCOUNTER — NON-APPOINTMENT (OUTPATIENT)
Age: 67
End: 2022-10-13

## 2022-10-13 ENCOUNTER — APPOINTMENT (OUTPATIENT)
Dept: CV DIAGNOSITCS | Facility: HOSPITAL | Age: 67
End: 2022-10-13

## 2022-10-13 RX ORDER — CLONAZEPAM 0.5 MG/1
0.5 TABLET ORAL DAILY
Qty: 10 | Refills: 0 | Status: DISCONTINUED | COMMUNITY
Start: 2022-01-04 | End: 2022-10-13

## 2022-10-13 NOTE — REASON FOR VISIT
[FreeTextEntry1] : Ms. Loving is a 65 yo F with PMH of Hypothyroidism, HLD, ? dementia presenting for  follow up evaluation. In August 2021, she spent a week hospitalized for COVID pneumonia and received Remdesivir. Last year, she had intermittent palpiitations and wore an Event monitor which revelaled sinus tachycardia without arrhythmia. Since her last visit, she has had worsening shortness of breath while laying flat and feels tired frequently. She denies chest pain. She notes that she has gained a lot of weight since her last visit. \par

## 2022-10-13 NOTE — ASSESSMENT
[FreeTextEntry1] : 67 yo F with HLD, Hypothyroidism, ? Dementia presenting with palpitations, SORIA\par \par ---TTE last year without significant abnormality, given fatigue and dyspnea, will obtain stress echo. \par ---Thirty pound weight loss may be contributing to some symptoms, discussed healthy eating, and exercise if stress testing is normal\par ---Will obtain recent bloodwork from PCP

## 2022-10-25 ENCOUNTER — OUTPATIENT (OUTPATIENT)
Dept: OUTPATIENT SERVICES | Facility: HOSPITAL | Age: 67
LOS: 1 days | End: 2022-10-25

## 2022-10-25 ENCOUNTER — APPOINTMENT (OUTPATIENT)
Dept: CV DIAGNOSITCS | Facility: HOSPITAL | Age: 67
End: 2022-10-25

## 2022-10-25 DIAGNOSIS — Z98.42 CATARACT EXTRACTION STATUS, LEFT EYE: Chronic | ICD-10-CM

## 2022-10-25 DIAGNOSIS — R06.00 DYSPNEA, UNSPECIFIED: ICD-10-CM

## 2022-10-25 DIAGNOSIS — Z98.41 CATARACT EXTRACTION STATUS, RIGHT EYE: Chronic | ICD-10-CM

## 2022-10-25 DIAGNOSIS — Z98.89 OTHER SPECIFIED POSTPROCEDURAL STATES: Chronic | ICD-10-CM

## 2022-10-25 PROCEDURE — 93350 STRESS TTE ONLY: CPT | Mod: 26

## 2022-10-25 PROCEDURE — 93325 DOPPLER ECHO COLOR FLOW MAPG: CPT | Mod: 26,GC

## 2022-10-25 PROCEDURE — 93016 CV STRESS TEST SUPVJ ONLY: CPT

## 2022-10-25 PROCEDURE — 93018 CV STRESS TEST I&R ONLY: CPT

## 2022-10-25 PROCEDURE — 93320 DOPPLER ECHO COMPLETE: CPT | Mod: 26,GC

## 2023-01-03 NOTE — ED ADULT TRIAGE NOTE - WEIGHT IN LBS
Marie thank you for your time today.  Here's a summary of our virtual visit and the plan we discussed.   Please let us know if you have any additional questions:    Stop metformin  Increase trulicity to 4.5mg weekly (new script sent)  Will touch base with Dr Fuentes and notify you regarding recommendations    Referral to counseling--you will be contacted to schedule    Have a good day!  Dr Paredes   
[phone]

## 2023-02-10 ENCOUNTER — APPOINTMENT (OUTPATIENT)
Dept: PSYCHIATRY | Facility: CLINIC | Age: 68
End: 2023-02-10
Payer: MEDICARE

## 2023-02-10 PROCEDURE — 99214 OFFICE O/P EST MOD 30 MIN: CPT

## 2023-02-10 NOTE — DISCUSSION/SUMMARY
[FreeTextEntry1] : Assessment: Patient is a 65 yo AA female with h/o psychosis, depression and anxiety seen today for medication management. . Patient is compliant with medications and tolerating without any new reported side effects. I-stop reviewed and no issues noticed.\par \par I-STOP:\par Patient Name: Ana Lilia Loving \par YOB: 1955 \par Address: [address] [place] [address] \par Sex: Female \par Rx Written	Rx Dispensed	Drug	Quantity	Days Supply	Prescriber Name	Prescriber Ruth #	Payment Method	Dispenser\par 09/24/2021	09/24/2021	lorazepam 1 mg tablet 	5	3	Heidi Garces (M S , PA-C)	[id number]	Medicare	[facility] [id number]\par \par \par PLAN:\par Continue Lexapro 20 mg PO QAM for depression and anxiety\par Continue Klonopin 0.5 mg PO PRN for anxiety. #10\par Continue Hydroxyzine 10 mg PO PRN for anxiety and insomnia\par Continue Mirtazapine 30 mg PO QHS for depression, insomnia and increase appetite. \par D/C Haldol 5 mg PO BID fro psychosis\par D/C Benztropine 0.5 mg PO QHS for EPS\par - Discussed risks and benefits of medications including side effects of GI and sexual side effects with SSRI, EPS with Haldol. Alternative strategies including no intervention discussed with patient. Patient consents to current medications as prescribed.\par - Discussed with patient regarding importance of abstinence and sobriety from alcohol and drugs. Educated about relationship between worsening mood/anxiety symptoms and drug use and improvement of symptoms with abstinence. \par - Discussed about unpredictable effects including cardiorespiratory collapse from the combination of illicit drugs and prescribed medications. Patient verbalized understanding.\par - Patient understands to contact clinic prn with concerns and agrees to call 911 or go to nearest ER if symptoms worsen.\par - Next appointment made in 3 month. Patient left the office without any distress.\par

## 2023-02-10 NOTE — SOCIAL HISTORY
[Lives with Spouse] : lives with spouse [Retired From Work] : retired from work [] :  [# Of Children ___] : has [unfilled] children [Less Than High School] : less than high school [None] : none [FreeTextEntry1] : Born: [place] and [place]\par Siblings: 5 sisters and 1 brother\par Parents: passed away. 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 \par /Kids  for 45 years. Has 3 daughters\par Abuse denies\par Legal: Denies\par

## 2023-02-10 NOTE — HISTORY OF PRESENT ILLNESS
[de-identified] : Patient is here in the office for face to face interview with writer for 3 month follow-up visit.\par \par Patient is accompanied by her grand daughter. No medication changes 3 months ago. Mood is anxious situationally. States her 2 daughters don’t help at all in the house financially or clean up nothing. States in Sept she was very depressed and anxious. No structure. States she wakes up and reads the bible on her phone and then watches a [employer] channel. States she rarely leaves her room because she does not want to start any fight with her daughters. Sleeping 7-8 hrs per night with the Mirtazapine 30 mg. Appetite is increased because of the Mirtazapine. Energy, concentration and motivation are 50/50. Denies any more AVH, SI or HI. or paranoia. No Klonopin this month. States she stopped taking the Haldol and she doesn't have any paranoia. Only on Lexapro 20 and Mirtazapine 30 mg.

## 2023-02-10 NOTE — PAST MEDICAL HISTORY
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

## 2023-03-28 ENCOUNTER — APPOINTMENT (OUTPATIENT)
Dept: PULMONOLOGY | Facility: CLINIC | Age: 68
End: 2023-03-28
Payer: MEDICARE

## 2023-03-28 VITALS
SYSTOLIC BLOOD PRESSURE: 104 MMHG | HEART RATE: 75 BPM | WEIGHT: 158 LBS | BODY MASS INDEX: 28 KG/M2 | DIASTOLIC BLOOD PRESSURE: 63 MMHG | OXYGEN SATURATION: 96 % | HEIGHT: 63 IN

## 2023-03-28 VITALS
DIASTOLIC BLOOD PRESSURE: 84 MMHG | SYSTOLIC BLOOD PRESSURE: 128 MMHG | RESPIRATION RATE: 15 BRPM | BODY MASS INDEX: 29.95 KG/M2 | TEMPERATURE: 97 F | HEIGHT: 63 IN | OXYGEN SATURATION: 96 % | HEART RATE: 78 BPM | WEIGHT: 169 LBS

## 2023-03-28 DIAGNOSIS — Z82.5 FAMILY HISTORY OF ASTHMA AND OTHER CHRONIC LOWER RESPIRATORY DISEASES: ICD-10-CM

## 2023-03-28 PROCEDURE — 94726 PLETHYSMOGRAPHY LUNG VOLUMES: CPT

## 2023-03-28 PROCEDURE — 99204 OFFICE O/P NEW MOD 45 MIN: CPT | Mod: 25

## 2023-03-28 PROCEDURE — ZZZZZ: CPT

## 2023-03-28 PROCEDURE — 94729 DIFFUSING CAPACITY: CPT

## 2023-03-28 PROCEDURE — 94010 BREATHING CAPACITY TEST: CPT

## 2023-03-28 RX ORDER — PREDNISOLONE ACETATE 10 MG/ML
1 SUSPENSION/ DROPS OPHTHALMIC DAILY
Qty: 1 | Refills: 1 | Status: COMPLETED | COMMUNITY
Start: 2017-08-18 | End: 2023-03-28

## 2023-03-28 RX ORDER — CHOLESTYRAMINE 4 G/9G
4 POWDER, FOR SUSPENSION ORAL
Refills: 0 | Status: ACTIVE | COMMUNITY

## 2023-03-28 RX ORDER — CELECOXIB 200 MG/1
200 CAPSULE ORAL DAILY
Qty: 30 | Refills: 2 | Status: COMPLETED | COMMUNITY
Start: 2022-07-26 | End: 2023-03-28

## 2023-03-28 RX ORDER — BUDESONIDE AND FORMOTEROL FUMARATE DIHYDRATE 160; 4.5 UG/1; UG/1
160-4.5 AEROSOL RESPIRATORY (INHALATION) TWICE DAILY
Qty: 1 | Refills: 3 | Status: ACTIVE | COMMUNITY
Start: 2023-03-28 | End: 1900-01-01

## 2023-03-28 RX ORDER — MECLIZINE HYDROCHLORIDE 12.5 MG/1
12.5 TABLET ORAL
Refills: 0 | Status: ACTIVE | COMMUNITY

## 2023-04-02 PROBLEM — Z82.5 FAMILY HISTORY OF EMPHYSEMA: Status: ACTIVE | Noted: 2023-03-28

## 2023-04-02 NOTE — HISTORY OF PRESENT ILLNESS
[Never] : never [TextBox_4] : Ms. Loving is a 67 year-old female with a history of HLD, hypothyroidism, psychosis, depression, and anxiety who presents for evaluation of dyspnea on exertion. She reports episodes when she has to take a deep breath. She reports occasional episodes of dyspnea even when resting, but dyspnea gets worse with exertion. Exercise tolerance is reduced to 2 flights of stairs. She can walk many blocks on level ground without developing dyspnea. No cough, wheezing, chest tightness, or nocturnal dyspnea. She reports a history of asthma as a child in [place] and she used to be treated with pills, then grew out of it.\par \par She has a history of COVID-19 in August 2021 for which she was hospitalized at [facility]. CXR showed bilateral lower lobe and peripherally predominant patchy opacities, consistent with COVID-19. She completed 5 days of remdesivir and 10 days of dexamethasone. She was discharged home with home oxygen, which she is now not requiring. Repeat CXR in Oct 2021 with clear lungs.\par \par Of note, she is on several medications for her psychiatric history, including escitalopram, clonazepam, hydroxyzine, and mirtazapine. Previously on haloperidol and benztropine.\par \par PFTs (March 2023) with normal spirometry, low lung volumes, and low normal diffusing capacity.\par \par 2D-echo (Oct 2022) with normal LV systolic function, no significant valvular disease, no pericardial effusion, and normal RV size and systolic function with no evidence of pulmonary hypertension.\par \par Stress echo (Oct 2022) with normal augmentation in LV systolic function, appropriate heart rate response, and normal stress echo with no evidence of inducible ischemia.

## 2023-04-02 NOTE — CONSULT LETTER
[Dear  ___] : Dear  [unfilled], [Consult Letter:] : I had the pleasure of evaluating your patient, [unfilled]. [Please see my note below.] : Please see my note below. [Consult Closing:] : Thank you very much for allowing me to participate in the care of this patient.  If you have any questions, please do not hesitate to contact me. [Sincerely,] : Sincerely, [FreeTextEntry2] : Dr. Silvina Zuniga MD\par Fax: [phone] [FreeTextEntry3] : Usman Maxwell MD\par Attending Physician in Pulmonary & Critical Care Medicine\par  of Medicine\par [employer] [facility]

## 2023-04-02 NOTE — ASSESSMENT
[FreeTextEntry1] : Ms. Loving is a 67 year-old female with a history of HLD, hypothyroidism, psychosis, depression, and anxiety who presents for evaluation of dyspnea on exertion. She reports episodes when she has to take a deep breath. She reports occasional episodes of dyspnea even when resting, but dyspnea gets worse with exertion. Exercise tolerance is reduced to 2 flights of stairs. She can walk many blocks on level ground without developing dyspnea. No cough, wheezing, chest tightness, or nocturnal dyspnea. She reports a history of asthma as a child in [place] and she used to be treated with pills, then grew out of it. Of note, she has a history of COVID-19 in August 2021 for which she was hospitalized at [facility]. CXR showed bilateral lower lobe and peripherally predominant patchy opacities, consistent with COVID-19. She completed 5 days of remdesivir and 10 days of dexamethasone. She was discharged home with home oxygen, which she is now not requiring. Repeat CXR in Oct 2021 with clear lungs.\par \par PFTs (March 2023) with normal spirometry, low lung volumes, and low normal diffusing capacity.\par \par 2D-echo (Oct 2022) with normal LV systolic function, no significant valvular disease, no pericardial effusion, and normal RV size and systolic function with no evidence of pulmonary hypertension.\par \par Stress echo (Oct 2022) with normal augmentation in LV systolic function, appropriate heart rate response, and normal stress echo with no evidence of inducible ischemia.\par \par Assessment:\par Dyspnea on exertion\par History of COVID-19\par Suspected [place] COVID-19 with pulmonary comorbidity\par Low lung volumes\par \par Plan:\par - Etiology of dyspnea likely due to long haul COVID-19 vs. deconditioning vs. asthma\par - Start Symbicort 160-4.5 mcg 2 puffs twice daily, rinse after use \par - Obtain dedicated Chest CT for further evaluation rule out parenchymal lung disease\par - I asked her to obtain cardiopulmonary exercise testing to further clarify the etiology of her dyspnea\par - Follow-up after CT chest and CPET

## 2023-04-26 ENCOUNTER — APPOINTMENT (OUTPATIENT)
Dept: CT IMAGING | Facility: IMAGING CENTER | Age: 68
End: 2023-04-26
Payer: MEDICARE

## 2023-04-26 ENCOUNTER — OUTPATIENT (OUTPATIENT)
Dept: OUTPATIENT SERVICES | Facility: HOSPITAL | Age: 68
LOS: 1 days | End: 2023-04-26
Payer: MEDICARE

## 2023-04-26 DIAGNOSIS — Z98.89 OTHER SPECIFIED POSTPROCEDURAL STATES: Chronic | ICD-10-CM

## 2023-04-26 DIAGNOSIS — R06.09 OTHER FORMS OF DYSPNEA: ICD-10-CM

## 2023-04-26 DIAGNOSIS — Z98.42 CATARACT EXTRACTION STATUS, LEFT EYE: Chronic | ICD-10-CM

## 2023-04-26 DIAGNOSIS — Z98.41 CATARACT EXTRACTION STATUS, RIGHT EYE: Chronic | ICD-10-CM

## 2023-04-26 PROCEDURE — 71250 CT THORAX DX C-: CPT | Mod: 26

## 2023-04-26 PROCEDURE — 71250 CT THORAX DX C-: CPT

## 2023-04-29 NOTE — PROVIDER CONTACT NOTE (OTHER) - NAME OF MD/NP/PA/DO NOTIFIED:
ER Provider Note    Scribed for Eric Castellanos M.d. by Veda Osullivan. 4/28/2023  6:25 PM    Primary Care Provider: Rody Sheldon P.A.-C.    CHIEF COMPLAINT  Chief Complaint   Patient presents with    Sore Throat     Today. Mother states that she woke up from a nap and she sounded hoarse and was gasping. EMS was called and it improved.     Difficulty Breathing    Fever     Today. No meds given.      LIMITATION TO HISTORY   Select: Pediatric patient    HPI/ROS  OUTSIDE HISTORIAN(S):  Parent Parent's provided patient history.    EXTERNAL RECORDS REVIEWED  External records show an established primary care provider, though no documented visits with that provider, and a number of unrelated urgent care visits for various complaints.    Fabian Valiente is a 3 y.o. female who presents to the ED with her parents for difficulty breathing onset today. Mother states that they went to the park today and the patient accidentally got sunscreen in her eye, so they took her home and then flushed her eyes out. Then, the patient took a nap because she seemed more tired than normal. The patient's parents state that the patient woke up from the  nap and was gasping and sounded hoarse, prompting them to call EMS. She claims that the patient's difficulty breathing alleviated on arrival of EMS. She also experiences low energy, sore throat, cough, congestion, and fever. Mother denies nausea or vomiting. She adds that the patient is currently in . No alleviating or exacerbating factors reported. The patient has no history of medical problems and takes no daily medications, other than an occasional Claritin.    PAST MEDICAL HISTORY  History reviewed. No pertinent past medical history.  Vaccinations are not UTD.     SURGICAL HISTORY  History reviewed. No pertinent surgical history.    FAMILY HISTORY  No pertinent family history.    SOCIAL HISTORY  Patient is accompanied by her parents, whom she lives with.     CURRENT  "MEDICATIONS  Previous Medications    FLUTICASONE (FLONASE) 50 MCG/ACT NASAL SPRAY    1 Apollo every day.    LORATADINE (CLARITIN ALLERGY CHILDRENS PO)    Take  by mouth.     ALLERGIES  Patient has no known allergies.    PHYSICAL EXAM  VITAL SIGNS: /62   Pulse 127   Temp (!) 38.7 °C (101.7 °F) (Temporal)   Resp 24   Ht 1.1 m (3' 7.31\")   Wt 15.9 kg (35 lb 0.9 oz)   SpO2 97%   BMI 13.14 kg/m²   Pulse ox interpretation: I interpret this pulse ox as normal.  Constitutional: Alert in no apparent distress.  HENT: No signs of trauma, Bilateral external ears normal, Small amount of dried mucus in nares, Hoarse voice.  Eyes: Conjunctiva normal, Non-icteric.   Neck: Normal range of motion, Supple, No stridor.   Lymphatic: No lymphadenopathy noted.   Cardiovascular: Regular rate and rhythm, no murmurs.   Thorax & Lungs: Normal breath sounds, No respiratory distress, No wheezing  Abdomen: Bowel sounds normal, Soft, No tenderness, No masses, No pulsatile masses. No peritoneal signs.  Skin: Warm, Dry, No erythema, No rash.   Back: No midline bony tenderness.  Extremities: Intact distal pulses, No edema, No cyanosis.  Musculoskeletal: Good range of motion in all major joints. No or major deformities noted.   Neurologic: Alert , Normal motor function, Normal sensory function, No focal deficits noted.   Psychiatric: Affect normal, Judgment normal, Mood normal.     DIAGNOSTIC STUDIES & PROCEDURES    Labs:   Results for orders placed or performed during the hospital encounter of 04/28/23   CoV-2, FLU A/B, and RSV by PCR (2-4 Hours [facility]) : Collect NP swab in VTM    Specimen: Respirate   Result Value Ref Range    Influenza virus A RNA Negative Negative    Influenza virus B, PCR Negative Negative    RSV, PCR Negative Negative    SARS-CoV-2 by PCR NotDetected     SARS-CoV-2 Source NP Swab    POC Group A Strep, PCR   Result Value Ref Range    POC Group A Strep, PCR Not Detected Not Detected     All labs reviewed by " Sara Madrid NP me.    COURSE & MEDICAL DECISION MAKING    ED Observation Status? No; Patient does not meet criteria for ED Observation.     INITIAL ASSESSMENT AND PLAN  Care Narrative:     6:25 PM - Patient seen and evaluated at bedside. Patient seen and examined at bedside. Informed patient's parents that her symptoms are likely related to viral illness, but that her exam is reassuring. Discussed plan of care, including alternating Tylenol and ibuprofen every three hours for continued fever management and use of a cool mist humidifier at night. Provided parents with the opportunity for viral testing prior to discharge. They accept. Ordered SARS-CoV-2, Flu A/B, and RSV to evaluate. Differential diagnoses include but are not limited to: Viral respiratory illness vs less likely strep throat since tonsils and throat appear normal    8:15 PM - Patient was reevaluated at bedside.  She has been resting comfortably with normal vital signs throughout her visit here.  She been watching television, no increased work of breathing, she remains comfortable and stable.  Discussed lab results with the patient's parents and informed them that they were not evident of strep, COVID, flu, or RSV, and that her symptoms are likely related to an additional viral syndrome. Recommended that they continue to treat the patient as recommended previously. Patient's parents had the opportunity to ask any questions. The plan for discharge was discussed with them and they were told to return for any new or worsening symptoms. They were also informed of the plans for follow up. Parent's are understanding and agreeable to the plan for discharge.     ADDITIONAL PROBLEM LIST AND DISPOSITION  Significant though understandable parental anxiety.               DISPOSITION AND DISCUSSIONS  I have discussed management of the patient with the following physicians and RIANA's: None    Discussion of management with other QHP or appropriate source(s): None     Escalation of  care considered, and ultimately not performed: blood analysis and diagnostic imaging.    Barriers to care at this time, including but not limited to: None.     Decision tools and prescription drugs considered including, but not limited to: Antibiotics   and Antivirals though no sign of treatable infectious disease was found .    DISPOSITION:  Patient will be discharged home with parent in stable condition.    FOLLOW UP:  [facility], Emergency Dept  [address] [facility] [id number] [phone]    If symptoms worsen    Rody Sheldon P.A.-C.  [address]  [facility] [address]  [phone]      As needed    Parent was given return precautions and verbalizes understanding. They will return for new or worsening symptoms.      FINAL IMPRESSION  1. Viral syndrome      Veda RAPP (Christiana), am scribing for, and in the presence of, Eric Castellanos M.D..    Electronically signed by: Veda Odom), 4/28/2023    IEric M.D. personally performed the services described in this documentation, as scribed by Veda Osullivan in my presence, and it is both accurate and complete.    The note accurately reflects work and decisions made by me.  Eric Castellanos M.D.  4/28/2023  8:28 PM

## 2023-05-04 ENCOUNTER — RESULT CHARGE (OUTPATIENT)
Age: 68
End: 2023-05-04

## 2023-05-05 ENCOUNTER — APPOINTMENT (OUTPATIENT)
Dept: PULMONOLOGY | Facility: CLINIC | Age: 68
End: 2023-05-05
Payer: MEDICARE

## 2023-05-05 LAB — SARS-COV-2 AG RESP QL IA.RAPID: NEGATIVE

## 2023-05-05 PROCEDURE — 87811 SARS-COV-2 COVID19 W/OPTIC: CPT

## 2023-05-05 PROCEDURE — 94375 RESPIRATORY FLOW VOLUME LOOP: CPT

## 2023-05-05 PROCEDURE — 94621 CARDIOPULM EXERCISE TESTING: CPT

## 2023-05-14 ENCOUNTER — NON-APPOINTMENT (OUTPATIENT)
Age: 68
End: 2023-05-14

## 2023-05-31 ENCOUNTER — APPOINTMENT (OUTPATIENT)
Dept: PULMONOLOGY | Facility: CLINIC | Age: 68
End: 2023-05-31
Payer: MEDICARE

## 2023-05-31 VITALS
HEART RATE: 72 BPM | WEIGHT: 165.38 LBS | TEMPERATURE: 97 F | OXYGEN SATURATION: 96 % | RESPIRATION RATE: 15 BRPM | SYSTOLIC BLOOD PRESSURE: 119 MMHG | HEIGHT: 63 IN | DIASTOLIC BLOOD PRESSURE: 83 MMHG | BODY MASS INDEX: 29.3 KG/M2

## 2023-05-31 DIAGNOSIS — R53.81 OTHER MALAISE: ICD-10-CM

## 2023-05-31 DIAGNOSIS — Z86.16 PERSONAL HISTORY OF COVID-19: ICD-10-CM

## 2023-05-31 DIAGNOSIS — R06.09 OTHER FORMS OF DYSPNEA: ICD-10-CM

## 2023-05-31 PROCEDURE — 99214 OFFICE O/P EST MOD 30 MIN: CPT

## 2023-05-31 NOTE — ASSESSMENT
[FreeTextEntry1] : Ms. Loving is a 67 year-old female with a history of HLD, hypothyroidism, psychosis, depression, and anxiety who presents for follow-up of her dyspnea on exertion. She reports that her dyspnea is improved. She tried to use Symbicort, but reports vague discomfort in her chest and she stopped using. ET is reduced to 2 flights of stairs. She can walk many blocks on level ground without developing dyspnea. No cough, wheezing, chest tightness, or nocturnal dyspnea. Has a history of asthma as a child in [place], but outgrew it. She has a history of COVID-19 in August 2021 for which she was hospitalized at [facility]. CXR showed bilateral lower lobe and peripherally predominant patchy opacities, consistent with COVID-19. She completed 5 days of remdesivir and 10 days of dexamethasone. She was discharged home with home oxygen, which she is now not requiring. Repeat CXR in Oct 2021 with clear lungs. Of note, she is on several medications for her psychiatric history, including escitalopram, clonazepam, hydroxyzine, and mirtazapine. Previously on haloperidol and benztropine.\par \par CT Chest (April 2023) with nonspecific very mild peripheral bandlike groundglass attenuation with reticular markings in the upper and lower lobes. No airspace consolidation. No suspicious nodule or mass. No peripheral cystic honeycombing. No traction bronchiectasis. Findings not consistent with pulmonary fibrosis or ILD, but most likely represent post-infectious/post-inflammatory sequelae, consistent with history of COVID-19.\par \par CPET (May 2023) with reduced VO2 max (13.1 mL/kg/min, 57%). Total work load achieved was 52 mcgovern (61%). Anaerobic threshold and breathing reserve normal. She augmented tidal volumes during exercise. Normal respiratory rate at rest and increased with exercise. Oxygen saturation normal 97-98%. Estimated VD/VT was normal at rest and decreased normally with exercise. Peak heart rate was 142 bpm (93%) during exercise. O2 pulse 6.7 mL/beats (74%). Based on these findings, the limitation to exercise is likely due to deconditioning.\par \par PFTs (March 2023) with normal spirometry, low lung volumes, and low normal diffusing capacity.\par \par 2D-echo (Oct 2022) with normal LV systolic function, no significant valvular disease, no pericardial effusion, and normal RV size and systolic function with no evidence of pulmonary hypertension.\par \par Stress echo (Oct 2022) with normal augmentation in LV systolic function, appropriate heart rate response, and normal stress echo with no evidence of inducible ischemia.\par \par Assessment:\par Dyspnea on exertion\par Physical deconditioning\par Long Haul COVID-19 with pulmonary comorbidity\par Low lung volumes\par \par Plan:\par - Etiology of dyspnea likely due to long haul COVID-19 + deconditioning\par - CT chest without evidence of pulmonary fibrosis or ILD, but with vague peripheral GGO's and reticular markings likely sequelae of prior COVID-19 viral infection\par - CPET consistent with physical deconditioning as primary etiology for dyspnea on exertion and reduced VO2 max\par - PFTs with normal spirometry, low lung volumes, and low normal diffusing capacity, also likely related to prior COVID-19 viral infection\par - Hold off on further Symbicort given reported chest discomfort with taking previously\par - Recommend to increase physical activity and exercise daily, including brisk walking for at least 30 minutes daily. Would recommend to increase incline on treadmill and take stairs rather than elevator\par - No further pulmonary workup required\par - Declining pneumococcal vaccination currently\par - Follow-up as necessary

## 2023-05-31 NOTE — HISTORY OF PRESENT ILLNESS
[Never] : never [TextBox_4] : Ms. Loving is a 67 year-old female with a history of HLD, hypothyroidism, psychosis, depression, and anxiety who presents for follow-up of her dyspnea on exertion. She reports that her dyspnea is improved. She tried to use Symbicort, but reports vague discomfort in her chest and she stopped using. ET is reduced to 2 flights of stairs. She can walk many blocks on level ground without developing dyspnea. No cough, wheezing, chest tightness, or nocturnal dyspnea. Has a history of asthma as a child in [place], but outgrew it. She has a history of COVID-19 in August 2021 for which she was hospitalized at [facility]. CXR showed bilateral lower lobe and peripherally predominant patchy opacities, consistent with COVID-19. She completed 5 days of remdesivir and 10 days of dexamethasone. She was discharged home with home oxygen, which she is now not requiring. Repeat CXR in Oct 2021 with clear lungs. Of note, she is on several medications for her psychiatric history, including escitalopram, clonazepam, hydroxyzine, and mirtazapine. Previously on haloperidol and benztropine.\par \par CT Chest (April 2023) with nonspecific very mild peripheral bandlike groundglass attenuation with reticular markings in the upper and lower lobes. No airspace consolidation. No suspicious nodule or mass. No peripheral cystic honeycombing. No traction bronchiectasis. Findings not consistent with pulmonary fibrosis or ILD, but most likely represent post-infectious/post-inflammatory sequelae, consistent with history of COVID-19.\par \par CPET (May 2023) with reduced VO2 max (13.1 mL/kg/min, 57%). Total work load achieved was 52 mcgovern (61%). Anaerobic threshold and breathing reserve normal. She augmented tidal volumes during exercise. Normal respiratory rate at rest and increased with exercise. Oxygen saturation normal 97-98%. Estimated VD/VT was normal at rest and decreased normally with exercise. Peak heart rate was 142 bpm (93%) during exercise. O2 pulse 6.7 mL/beats (74%). Based on these findings, the limitation to exercise is likely due to deconditioning.\par \par PFTs (March 2023) with normal spirometry, low lung volumes, and low normal diffusing capacity.\par \par 2D-echo (Oct 2022) with normal LV systolic function, no significant valvular disease, no pericardial effusion, and normal RV size and systolic function with no evidence of pulmonary hypertension.\par \par Stress echo (Oct 2022) with normal augmentation in LV systolic function, appropriate heart rate response, and normal stress echo with no evidence of inducible ischemia.

## 2023-06-05 ENCOUNTER — APPOINTMENT (OUTPATIENT)
Dept: PSYCHIATRY | Facility: CLINIC | Age: 68
End: 2023-06-05

## 2023-06-13 NOTE — ED PROVIDER NOTE - EKG #1 DATE/TIME
22-Sep-2021 21:30 Tranexamic Acid Counseling:  Patient advised of the small risk of bleeding problems with tranexamic acid. They were also instructed to call if they developed any nausea, vomiting or diarrhea. All of the patient's questions and concerns were addressed.

## 2023-06-19 NOTE — ED ADULT NURSE NOTE - SUICIDE SCREENING QUESTION 3
PASSED per protocol, refill sent.     Last PE: 3--tb    Future Appointments   Date Time Provider Oneida Chacko   9/11/2023  9:00 AM Valeriy Wilson MD [facility] [facility]
No

## 2023-07-19 ENCOUNTER — APPOINTMENT (OUTPATIENT)
Dept: PSYCHIATRY | Facility: CLINIC | Age: 68
End: 2023-07-19
Payer: MEDICARE

## 2023-07-19 DIAGNOSIS — F51.02 ADJUSTMENT INSOMNIA: ICD-10-CM

## 2023-07-19 PROCEDURE — 99214 OFFICE O/P EST MOD 30 MIN: CPT

## 2023-07-19 RX ORDER — CLONAZEPAM 0.5 MG/1
0.5 TABLET ORAL
Refills: 0 | Status: COMPLETED | COMMUNITY
End: 2023-07-19

## 2023-07-19 RX ORDER — MIRTAZAPINE 30 MG/1
30 TABLET, FILM COATED ORAL
Qty: 90 | Refills: 0 | Status: COMPLETED | COMMUNITY
Start: 2021-12-31 | End: 2023-07-19

## 2023-07-19 NOTE — SOCIAL HISTORY
[Lives with Spouse] : lives with spouse [Retired From Work] : retired from work [] :  [# Of Children ___] : has [unfilled] children [Less Than High School] : less than high school [None] : none [FreeTextEntry1] : Born: [place] and came to [place] in [place]\par Siblings: 5 sisters and 1 brother\par Parents: passed away. 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 \par /Kids  for 45 years. Has 3 daughters\par Abuse denies\par Legal: Denies\par

## 2023-07-19 NOTE — DISCUSSION/SUMMARY
[FreeTextEntry1] : Assessment: Patient is a 65 yo AA female with h/o psychosis, depression and anxiety seen today for medication management. . Patient is compliant with medications and tolerating without any new reported side effects. I-stop reviewed and no issues noticed.\par \par I-STOP:\par Patient Name: Ana Lilia Loving \par YOB: 1955 \par Address: [address] [place] [address] \par Sex: Female \par Rx Written	Rx Dispensed	Drug	Quantity	Days Supply	Prescriber Name	Prescriber Ruth #	Payment Method	Dispenser\par 09/24/2021	09/24/2021	lorazepam 1 mg tablet 	5	3	Heidi Garces (M S , PA-C)	[id number]	Medicare	[facility] [id number]\par \par \par PLAN:\par Re-start Lexapro 10 mg PO QAM for depression and anxiety\par Continue Klonopin 0.5 mg PO PRN for anxiety. #10\par Re-start Hydroxyzine 10 mg PO PRN for anxiety and insomnia\par D/C Mirtazapine 30 mg PO QHS for depression, insomnia and increase appetite. \par D/C Haldol 5 mg PO BID fro psychosis\par D/C Benztropine 0.5 mg PO QHS for EPS\par - Discussed risks and benefits of medications including side effects of GI and sexual side effects with SSRI, EPS with Haldol. Alternative strategies including no intervention discussed with patient. Patient consents to current medications as prescribed.\par - Discussed with patient regarding importance of abstinence and sobriety from alcohol and drugs. Educated about relationship between worsening mood/anxiety symptoms and drug use and improvement of symptoms with abstinence. \par - Discussed about unpredictable effects including cardiorespiratory collapse from the combination of illicit drugs and prescribed medications. Patient verbalized understanding.\par - Patient understands to contact clinic prn with concerns and agrees to call 911 or go to nearest ER if symptoms worsen.\par - Next appointment made in 1 month. Patient left the office without any distress.\par

## 2023-07-19 NOTE — PAST MEDICAL HISTORY
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

## 2023-07-19 NOTE — HISTORY OF PRESENT ILLNESS
[de-identified] : Patient is here in the office for face to face interview with writer for follow-up visit.\par \par Writer has not seen the patient since Feb 2023. At that time no medication changes. States she discontinued the Lexapro and Mirtazapine ad Vistaril. [place] she didn't need it. Takes Klonopin 0.5 mg PRN. \par \par Mood: anxiety because her brother was diagnosed with cancer and every time she sees him she has more anxiety and nausea like sensation. Takes the Klonopin 05 mg PRN. Other stressors: States her 2 daughters don’t help at all in the house financially or clean up nothing. No structure. States she wakes up and reads the bible on her phone and then watches a Hinduism channel. States she rarely leaves her room because she does not want to start any fight with her daughters.\par Sleep:  3-4 hours . Appetite is good. Energy, concentration and motivation are 50/50. Denies any more AVH, SI or HI. or paranoia.

## 2023-08-28 ENCOUNTER — APPOINTMENT (OUTPATIENT)
Dept: PSYCHIATRY | Facility: CLINIC | Age: 68
End: 2023-08-28
Payer: MEDICARE

## 2023-08-28 PROCEDURE — 99214 OFFICE O/P EST MOD 30 MIN: CPT

## 2023-08-28 NOTE — DISCUSSION/SUMMARY
[FreeTextEntry1] : Assessment: Patient is a 67 yo AA female with h/o psychosis, depression and anxiety seen today for medication management. . Patient is compliant with medications and tolerating without any new reported side effects. I-stop reviewed and no issues noticed.  I-STOP: Patient Name: Ana Lilia Loving  YOB: 1955  Address: [address] [place] [address]  Sex: Female  Rx Written	Rx Dispensed	Drug	Quantity	Days Supply	Prescriber Name	Prescriber Ruth #	Payment Method	Dispenser 09/24/2021	09/24/2021	lorazepam 1 mg tablet 	5	3	Heidi Garces (M S , PA-C)	[id number]	Medicare	[facility] [id number]   PLAN: Increase Lexapro 10 to 15 mg PO QAM for depression and anxiety Continue Klonopin 0.5 mg PO PRN for anxiety. #10 Continue Hydroxyzine 10 mg PO PRN for anxiety and insomnia D/C Mirtazapine 30 mg PO QHS for depression, insomnia and increase appetite.  D/C Haldol 5 mg PO BID fro psychosis D/C Benztropine 0.5 mg PO QHS for EPS - Discussed risks and benefits of medications including side effects of GI and sexual side effects with SSRI, EPS with Haldol. Alternative strategies including no intervention discussed with patient. Patient consents to current medications as prescribed. - Discussed with patient regarding importance of abstinence and sobriety from alcohol and drugs. Educated about relationship between worsening mood/anxiety symptoms and drug use and improvement of symptoms with abstinence.  - Discussed about unpredictable effects including cardiorespiratory collapse from the combination of illicit drugs and prescribed medications. Patient verbalized understanding. - Patient understands to contact clinic prn with concerns and agrees to call 911 or go to nearest ER if symptoms worsen. - Next appointment made in 1 month. Patient left the office without any distress.

## 2023-08-28 NOTE — SOCIAL HISTORY
[Lives with Spouse] : lives with spouse [Retired From Work] : retired from work [] :  [# Of Children ___] : has [unfilled] children [Less Than High School] : less than high school [None] : none [FreeTextEntry1] : Born: [place] and came [place]\par  Siblings: 5 sisters and 1 brother\par  Parents: passed away. 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 Has 3 daughters\par  Abuse denies\par  Legal: Denies\par

## 2023-08-28 NOTE — PAST MEDICAL HISTORY
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

## 2023-09-02 NOTE — HISTORY OF PRESENT ILLNESS
[de-identified] : Patient is here in the office for face-to-face interview with writer for 1 month follow-up visit.  At that time Lexapro 10 mg and Vistaril 10 mg was started on the patient. States her anxiety is situational and it is also due to having no structure in her life.   Mood: anxious but states it is manageable. States when she feels anxious, she gets nausea or vomiting sensation in her stomach. States she is thinking about her daughters. She is ruminating about them. Brother has cancer what will happen to him.  Sleeping: states she is using the Vistaril 10 mg PRN. States she goes to sleep b/t 12am-5 am. 5 am she goes to [place]. Then she takes a power nap for an hour after coming back form [place] and states she feels refreshed then.  Appetite is good. Energy, concentration and motivation are better. Denies any more AVH, SI or HI. or paranoia.  No

## 2023-10-10 ENCOUNTER — APPOINTMENT (OUTPATIENT)
Dept: PSYCHIATRY | Facility: CLINIC | Age: 68
End: 2023-10-10

## 2023-10-23 NOTE — PATIENT PROFILE ADULT - BILL PAYMENT
Detail Level: Generalized Sunscreen Recommendations: Patient is to use sunscreen daily on the affected areas no

## 2023-10-26 NOTE — ED PROVIDER NOTE - NS ED MD DISPO SPECIAL CONSIDERATION1
High Suspicion of COVID-19 Imiquimod Counseling:  I discussed with the patient the risks of imiquimod including but not limited to erythema, scaling, itching, weeping, crusting, and pain.  Patient understands that the inflammatory response to imiquimod is variable from person to person and was educated regarded proper titration schedule.  If flu-like symptoms develop, patient knows to discontinue the medication and contact us.

## 2023-11-13 ENCOUNTER — APPOINTMENT (OUTPATIENT)
Dept: ORTHOPEDIC SURGERY | Facility: CLINIC | Age: 68
End: 2023-11-13
Payer: MEDICARE

## 2023-11-13 VITALS — BODY MASS INDEX: 29.23 KG/M2 | WEIGHT: 165 LBS | HEIGHT: 63 IN

## 2023-11-13 DIAGNOSIS — M75.82 OTHER SHOULDER LESIONS, LEFT SHOULDER: ICD-10-CM

## 2023-11-13 DIAGNOSIS — M75.02 ADHESIVE CAPSULITIS OF LEFT SHOULDER: ICD-10-CM

## 2023-11-13 PROCEDURE — 73030 X-RAY EXAM OF SHOULDER: CPT | Mod: LT

## 2023-11-13 PROCEDURE — 20610 DRAIN/INJ JOINT/BURSA W/O US: CPT | Mod: LT

## 2023-11-13 PROCEDURE — 99214 OFFICE O/P EST MOD 30 MIN: CPT | Mod: 25

## 2023-11-13 PROCEDURE — J3490M: CUSTOM | Mod: LT

## 2023-11-29 ENCOUNTER — APPOINTMENT (OUTPATIENT)
Dept: PSYCHIATRY | Facility: CLINIC | Age: 68
End: 2023-11-29
Payer: MEDICARE

## 2023-11-29 PROCEDURE — 99214 OFFICE O/P EST MOD 30 MIN: CPT

## 2023-11-29 RX ORDER — HYDROXYZINE HYDROCHLORIDE 10 MG/1
10 TABLET ORAL
Qty: 30 | Refills: 0 | Status: ACTIVE | COMMUNITY
Start: 2023-07-19 | End: 1900-01-01

## 2023-11-29 RX ORDER — ESCITALOPRAM OXALATE 5 MG/1
5 TABLET ORAL DAILY
Qty: 30 | Refills: 0 | Status: COMPLETED | COMMUNITY
Start: 2023-08-28 | End: 2023-11-29

## 2024-01-01 NOTE — ED PROVIDER NOTE - CARE PROVIDER_API CALL
I have personally seen and examined the patient. I have collaborated with and supervised the Isabelle Ward  NEUROLOGY - GENERAL  [address] [place] [address]  Phone: [phone]  Fax: [phone]  Follow Up Time: 7-10 Days

## 2024-01-16 ENCOUNTER — APPOINTMENT (OUTPATIENT)
Dept: ORTHOPEDIC SURGERY | Facility: CLINIC | Age: 69
End: 2024-01-16

## 2024-01-23 ENCOUNTER — APPOINTMENT (OUTPATIENT)
Dept: CARDIOLOGY | Facility: CLINIC | Age: 69
End: 2024-01-23
Payer: MEDICARE

## 2024-01-23 ENCOUNTER — NON-APPOINTMENT (OUTPATIENT)
Age: 69
End: 2024-01-23

## 2024-01-23 VITALS
DIASTOLIC BLOOD PRESSURE: 81 MMHG | BODY MASS INDEX: 27.64 KG/M2 | SYSTOLIC BLOOD PRESSURE: 118 MMHG | TEMPERATURE: 101.7 F | WEIGHT: 156 LBS | HEIGHT: 63 IN | HEART RATE: 94 BPM | OXYGEN SATURATION: 95 %

## 2024-01-23 PROCEDURE — 93000 ELECTROCARDIOGRAM COMPLETE: CPT

## 2024-01-23 PROCEDURE — 99214 OFFICE O/P EST MOD 30 MIN: CPT | Mod: 25

## 2024-02-12 NOTE — ASSESSMENT
[FreeTextEntry1] : 69 yo F with HLD, Hypothyroidism, ? Dementia presenting for follow up  ---Stress Echo 10/22 with poor METS, however no ischemic findings or cardiac abnormalities --Overall doing well, no further cardiac workup at this time ---Will obtain recent bloodwork from PCP, on Statin

## 2024-02-12 NOTE — REASON FOR VISIT
[FreeTextEntry1] : Ms. Loving is a 67 yo F with PMH of Hypothyroidism, HLD, ? dementia presenting for  follow up evaluation. In August 2021, she spent a week hospitalized for COVID pneumonia and received Remdesivir. Last year, she had intermittent palpiitations and wore an Event monitor which revelaled sinus tachycardia without arrhythmia. During her last visit, she complained of worsening SOB, she was evaluated by Pulmonologist, Dr. Maxwell. Possibly long COVID. She states her Cholesterol was very high with her PCP check due to dietary indiscretions, she was stared on Atorvastatin. She otherwise feels well.

## 2024-02-23 ENCOUNTER — APPOINTMENT (OUTPATIENT)
Dept: PSYCHIATRY | Facility: CLINIC | Age: 69
End: 2024-02-23
Payer: MEDICARE

## 2024-02-23 PROCEDURE — 99214 OFFICE O/P EST MOD 30 MIN: CPT

## 2024-02-23 RX ORDER — ESCITALOPRAM OXALATE 20 MG/1
20 TABLET ORAL DAILY
Qty: 90 | Refills: 0 | Status: ACTIVE | COMMUNITY
Start: 2022-01-04 | End: 1900-01-01

## 2024-02-23 RX ORDER — CLONAZEPAM 0.5 MG/1
0.5 TABLET ORAL DAILY
Qty: 10 | Refills: 0 | Status: ACTIVE | COMMUNITY
Start: 2023-06-28 | End: 1900-01-01

## 2024-02-23 NOTE — HISTORY OF PRESENT ILLNESS
[de-identified] : Patient is here in the office for face-to-face interview with writer for 3 month follow-up visit.  At that time Lexapro was increased from 15 to 20 mg. States she never received the 20 mg even though writer sent it. Has been taking 15 mg. Patient states she likes the medication and is doing well on it. Her brother passed away on Oct due to cancer.   Modo: it is alright despite the brother's death. States she uses the Klonopin 0.5 mg which helps her.  Sleeping: states 6-7 hours of sleep.  Appetite is good. Energy, concentration and motivation are better. Denies any more AVH, SI or HI. or paranoia.

## 2024-02-23 NOTE — DISCUSSION/SUMMARY
[FreeTextEntry1] : Assessment: Patient is a 65 yo AA female with h/o psychosis, depression and anxiety seen today for medication management. . Patient is compliant with medications and tolerating without any new reported side effects. I-stop reviewed and no issues noticed.  I-STOP: Patient Name: Ana Lilia Loving  YOB: 1955  Address: [address] [place] [address]  Sex: Female  Rx Written	Rx Dispensed	Drug	Quantity	Days Supply	Prescriber Name	Prescriber Ruth #	Payment Method	Dispenser 09/24/2021	09/24/2021	lorazepam 1 mg tablet 	5	3	Heidi Garces (M S , PA-C)	[id number]	Medicare	[facility] [id number]   PLAN: Increase Lexapro 15 to 20 mg PO QAM for depression and anxiety Continue Klonopin 0.5 mg PO PRN for anxiety. #10 Continue Hydroxyzine 10 mg PO PRN for anxiety and insomnia D/C Mirtazapine 30 mg PO QHS for depression, insomnia and increase appetite.  D/C Haldol 5 mg PO BID fro psychosis D/C Benztropine 0.5 mg PO QHS for EPS - Discussed risks and benefits of medications including side effects of GI and sexual side effects with SSRI, EPS with Haldol. Alternative strategies including no intervention discussed with patient. Patient consents to current medications as prescribed. - Discussed with patient regarding importance of abstinence and sobriety from alcohol and drugs. Educated about relationship between worsening mood/anxiety symptoms and drug use and improvement of symptoms with abstinence.  - Discussed about unpredictable effects including cardiorespiratory collapse from the combination of illicit drugs and prescribed medications. Patient verbalized understanding. - Patient understands to contact clinic prn with concerns and agrees to call 911 or go to nearest ER if symptoms worsen. - Next appointment made in 3 month. Patient left the office without any distress.

## 2024-02-23 NOTE — PAST MEDICAL HISTORY
[FreeTextEntry1] : Inpatient hospitalization admission: [facility] for COVID induced psychosis\par  \par  Past SI attempts: denies\par  \par  Therapy: denies\par  \par  Psychiatrist: denies\par  \par  Medication trials: \par  \par  Current medications: Haldol 5 mg BID, Cogentin 0.5 mg QHS, Mirtazapine 30 mg PO QHS, Hydroxyzine 100 mg PO PRN\par  \par  Firearms: denies\par  Medical: HTN, HLD\par  Allergies: Multivitamin B complex (decreased breathing)

## 2024-02-23 NOTE — SOCIAL HISTORY
[Lives with Spouse] : lives with spouse [Retired From Work] : retired from work [] :  [# Of Children ___] : has [unfilled] children [None] : none [Less Than High School] : less than high school [FreeTextEntry1] : Born: [place] and came to [place] in [place]\par  Siblings: 5 sisters and 1 brother\par  Parents: passed away. (father passed when she was very young and mother passed away 2 years ago)\par  Education: 9th grade\par  Occupation: retired. 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 Has 3 daughters\par  Abuse denies\par  Legal: Denies\par

## 2024-03-06 NOTE — H&P ADULT - RESPIRATORY
[facility] EMERGENCY DEPT  EMERGENCY DEPARTMENT ENCOUNTER      Pt Name: Del Yoon  MRN: [id number]  Birthdate 1949  Date of evaluation: 3/5/2024  Provider: Ramon Schumacher DO    CHIEF COMPLAINT       Chief Complaint   Patient presents with    Shortness of Breath    Influenza                HISTORY OF PRESENT ILLNESS   (Location/Symptom, Timing/Onset, Context/Setting, Quality, Duration, Modifying Factors, Severity)  Note limiting factors.   Del Yoon is a 74 y.o. male who presents to the emergency department presenting to the emergency department from assisted living facility for increasing shortness of breath recently diagnosed with influenza A.  Denies any previous lung or heart history.  No known history of COPD or CHF does have noted history of CAD that I can see in the past.  Reports increasing shortness of breath for the last several days.  He is also had more generalized weakness more than normal as well which also involved with his facility to switch him to skilled nursing facility.  They were concerned that he was too generally weak and needed more assistance of sentiment for evaluation.  No other acute complaints or concerns at this time per patient or his wife.    HPI    Nursing Notes were reviewed.    REVIEW OF SYSTEMS    (2-9 systems for level 4, 10 or more for level 5)     Review of Systems   Constitutional:  Negative for fever.   Eyes:  Negative for pain.   Respiratory:  Positive for shortness of breath.    Cardiovascular:  Negative for chest pain.   Gastrointestinal:  Negative for abdominal pain, diarrhea, nausea and vomiting.   Genitourinary:  Negative for dysuria and flank pain.   Musculoskeletal:  Negative for back pain.   Skin:  Negative for rash.   Neurological:  Negative for headaches.   Psychiatric/Behavioral:  Negative for confusion.        Except as noted above the remainder of the review of systems was reviewed and negative.       PAST MEDICAL HISTORY     Past Medical History:  - Abnormal; Notable for the following components:    Est, Glom Filt Rate 48 (*)     All other components within normal limits   TROPONIN - Abnormal; Notable for the following components:    Troponin, High Sensitivity 16 (*)     All other components within normal limits   CULTURE, BLOOD 1   CULTURE, BLOOD 1   MAGNESIUM   ANION GAP   OSMOLALITY   URINALYSIS       All other labs were within normal range or not returned as of this dictation.    EMERGENCY DEPARTMENT COURSE and DIFFERENTIAL DIAGNOSIS/MDM:   Vitals:    Vitals:    03/05/24 1817 03/05/24 1818 03/05/24 1925 03/05/24 2025   BP:  (!) 181/82 (!) 182/85 (!) 182/70   Pulse: 96  92 86   Resp: 30 18 22 21   Temp:       TempSrc:       SpO2:   94% 91%   Weight:       Height:             Medical Decision Making  74-year-old male previous history of CAD no known history of CHF or COPD presenting for evaluation of increasing shortness of breath reportedly recently diagnosed with influenza though I cannot see that result.  On examination he does have faint end expiratory wheezes heard throughout with occasional bibasilar crackles heard as well.  Heart was regular rate and rhythm without murmurs rubs or gallops.  Belly soft nontender.  Pulses intact.  He does have 2+ pitting edema to bilateral lower extremities.  Exam is otherwise unremarkable.  Considering clinical history and current presentation concerning for possible underlying COPD or asthma in the setting of influenza.  He satting 90 to 91% but does have some mildly increased work of breathing with some tachypnea on my exam.  I anticipate that he will likely be admitted for his increased work of breathing and borderline hypoxia in the setting of flu.  Will further evaluate for superimposed pneumonia electrolyte abnormalities or superimposed CHF.  Will additionally obtain EKG and chest x-ray for further evaluation  Laboratory workup did show slightly elevated BNP, troponin also slightly elevated at 16 with no  respiratory rate of 30 at 1 point in time. [DANIELA]      ED Course User Index  [DANIELA] Ramon Schumacher DO         CRITICAL CARE TIME   Total Critical Care time was 0 minutes, excluding separately reportable procedures.    CONSULTS:  None    PROCEDURES:  Unless otherwise noted below, none     Procedures      FINAL IMPRESSION      1. Pneumonia of left lower lobe due to infectious organism    2. Influenza A    3. Elevated troponin    4. Elevated brain natriuretic peptide (BNP) level    5. Lower extremity edema    6. Shortness of breath    7. Lactic acidosis          DISPOSITION/PLAN   DISPOSITION Admitted 03/05/2024 07:15:59 PM      PATIENT REFERRED TO:  No follow-up provider specified.    DISCHARGE MEDICATIONS:  New Prescriptions    No medications on file     Controlled Substances Monitoring:          No data to display                (Please note that portions of this note were completed with a voice recognition program.  Efforts were made to edit the dictations but occasionally words are mis-transcribed.)    Ramon Schumacher DO (electronically signed)  Attending Emergency Physician          detailed exam

## 2024-03-16 NOTE — ED ADULT TRIAGE NOTE - CCCP TRG CHIEF CMPLNT
Patient arrives via wheelchair c/o body aches, fever, generalized abdominal pain for the last 3 days.   Last Ibuprofen at 9AM.    throat irritation/difficulty breathing

## 2024-05-24 ENCOUNTER — APPOINTMENT (OUTPATIENT)
Dept: PSYCHIATRY | Facility: CLINIC | Age: 69
End: 2024-05-24

## 2024-06-04 ENCOUNTER — APPOINTMENT (OUTPATIENT)
Dept: CARDIOLOGY | Facility: CLINIC | Age: 69
End: 2024-06-04

## 2024-07-23 ENCOUNTER — NON-APPOINTMENT (OUTPATIENT)
Age: 69
End: 2024-07-23

## 2024-07-23 ENCOUNTER — APPOINTMENT (OUTPATIENT)
Dept: CARDIOLOGY | Facility: CLINIC | Age: 69
End: 2024-07-23

## 2024-07-23 VITALS
HEIGHT: 63 IN | SYSTOLIC BLOOD PRESSURE: 117 MMHG | BODY MASS INDEX: 28.35 KG/M2 | WEIGHT: 160 LBS | DIASTOLIC BLOOD PRESSURE: 79 MMHG | OXYGEN SATURATION: 97 % | HEART RATE: 61 BPM

## 2024-07-23 PROCEDURE — 99214 OFFICE O/P EST MOD 30 MIN: CPT | Mod: 25

## 2024-07-23 PROCEDURE — 93000 ELECTROCARDIOGRAM COMPLETE: CPT

## 2024-08-02 NOTE — REASON FOR VISIT
[FreeTextEntry1] : Ms. Loving is a 69 yo F with PMH of Hypothyroidism, HLD, ? dementia presenting for  follow up evaluation. In August 2021, she spent a week hospitalized for COVID pneumonia and received Remdesivir. She has had intermittent palpitations, wore an Event monitor which revealed sinus tachycardia without arrhythmia. She states that she has stopped all of her Psychiatric medications, feels she does not need them anymore and feels well. She also states that her Cholesterol decreased and her Statin was decreased.  She has been walking frequently and denies chest pain, shortness of breath, palptations.

## 2024-08-02 NOTE — ASSESSMENT
[FreeTextEntry1] : 67 yo F with HLD, Hypothyroidism, ? Dementia presenting for follow up  ---Stress Echo 10/22 with poor METS, however no ischemic findings or cardiac abnormalities --Overall doing well, no further cardiac workup at this time ---Will obtain recent bloodwork from PCP, on Statin

## 2024-10-08 ENCOUNTER — APPOINTMENT (OUTPATIENT)
Dept: ORTHOPEDIC SURGERY | Facility: CLINIC | Age: 69
End: 2024-10-08
Payer: MEDICARE

## 2024-10-08 DIAGNOSIS — M25.571 PAIN IN RIGHT ANKLE AND JOINTS OF RIGHT FOOT: ICD-10-CM

## 2024-10-08 DIAGNOSIS — S93.491A SPRAIN OF OTHER LIGAMENT OF RIGHT ANKLE, INITIAL ENCOUNTER: ICD-10-CM

## 2024-10-08 PROCEDURE — 73610 X-RAY EXAM OF ANKLE: CPT | Mod: RT

## 2024-10-08 PROCEDURE — 99213 OFFICE O/P EST LOW 20 MIN: CPT

## 2024-10-08 PROCEDURE — 73630 X-RAY EXAM OF FOOT: CPT | Mod: RT

## 2024-10-08 RX ORDER — DICLOFENAC SODIUM 100 MG/1
TABLET, FILM COATED, EXTENDED RELEASE ORAL
Refills: 0 | Status: ACTIVE | COMMUNITY

## 2024-10-13 PROBLEM — S93.491A SPRAIN OF OTHER LIGAMENT OF RIGHT ANKLE, INITIAL ENCOUNTER: Status: ACTIVE | Noted: 2024-10-13

## 2024-10-28 ENCOUNTER — APPOINTMENT (OUTPATIENT)
Dept: ORTHOPEDIC SURGERY | Facility: CLINIC | Age: 69
End: 2024-10-28

## 2025-01-27 ENCOUNTER — APPOINTMENT (OUTPATIENT)
Dept: ORTHOPEDIC SURGERY | Facility: CLINIC | Age: 70
End: 2025-01-27
Payer: MEDICARE

## 2025-01-27 DIAGNOSIS — E78.00 PURE HYPERCHOLESTEROLEMIA, UNSPECIFIED: ICD-10-CM

## 2025-01-27 DIAGNOSIS — R29.898 OTHER SYMPTOMS AND SIGNS INVOLVING THE MUSCULOSKELETAL SYSTEM: ICD-10-CM

## 2025-01-27 DIAGNOSIS — M25.671 STIFFNESS OF RIGHT ANKLE, NOT ELSEWHERE CLASSIFIED: ICD-10-CM

## 2025-01-27 PROCEDURE — 99204 OFFICE O/P NEW MOD 45 MIN: CPT

## 2025-01-27 RX ORDER — MELOXICAM 15 MG/1
15 TABLET ORAL
Qty: 30 | Refills: 1 | Status: ACTIVE | COMMUNITY
Start: 2025-01-27 | End: 1900-01-01

## 2025-02-17 ENCOUNTER — APPOINTMENT (OUTPATIENT)
Dept: ORTHOPEDIC SURGERY | Facility: CLINIC | Age: 70
End: 2025-02-17

## 2025-03-11 NOTE — ED ADULT TRIAGE NOTE - BP NONINVASIVE DIASTOLIC (MM HG)
[facility] INTERNAL MEDICINE CLINIC NOTE    Patient name: Elissa Freeman  YOB: 1958   MRN: [id number]  Appointment date: 3/11/2025  Appointment time: 02:35 PM    Subjective     HPI    Elissa Freeman is a 66 y.o.  female with a PMHx positive for obesity, MOHSEN, multinodular goiter, hypertension, hyperlipidemia, diabetes mellitus type 2, CAD, HFrEF with Grade 1 DD (EF 45%), persistent Afib, chronic kidney disease IIIb, depression, gout, adenocarcinoma of the colon stage IIA status post hemicolectomy (2014) and adjuvant chemotherapy (2015), renal cell carcinoma status post nephrectomy (2011), who presented to IM clinic today for follow up visit. Patient today reports painless bright red blood per rectum of about a quarter size amount x 2 weeks which ceased approximately 3 days ago. Denies fever, chills, sweats. Has had a 5 lb weight gain since last visit. Denies any other acute complaints.      Past Medical History:  Past Medical History:   Diagnosis Date    Asthma     Atrial fibrillation     Cancer     CKD (chronic kidney disease)     Coronary artery disease     Diabetes mellitus     Hyperlipidemia     Hypertension     Obesity, unspecified     MOHSEN (obstructive sleep apnea)     Unspecified cataract      Past Surgical History:  Past Surgical History:   Procedure Laterality Date    CATARACT EXTRACTION Left 11/28/2022    CHOLECYSTECTOMY      COLON SURGERY      COLONOSCOPY N/A 04/27/2023    Procedure: COLONOSCOPY;  Surgeon: Jose Miguel Rosales MD;  Location: [facility] ENDOSCOPY;  Service: Endoscopy;  Laterality: N/A;    excision of lipoma      EYE SURGERY      HYSTERECTOMY      HYSTERECTOMY, VAGINAL, WITH SALPINGO-OOPHORECTOMY      NEPHRECTOMY Left     TUBAL LIGATION       Family History:  Family History   Problem Relation Name Age of Onset    Hypertension Mother Fawn     Coronary artery disease Mother Fawn     Hyperlipidemia Mother Fawn     Asthma Mother Fawn     Stroke Father Gradnigo     Cancer  87 Brother Anil Bangura      Social History:  Social History     Tobacco Use    Smoking status: Never    Smokeless tobacco: Never   Substance Use Topics    Alcohol use: Not Currently     Comment: frozen drinks once a month, giovanni    Drug use: Never     Allergies:  Review of patient's allergies indicates:  No Known Allergies  Home Medications:  Prior to Admission medications    Medication Sig Start Date End Date Taking? Authorizing Provider   acetaminophen (TYLENOL EXTRA STRENGTH) 500 MG tablet Take 1,000 mg by mouth every 6 (six) hours as needed for Pain.    Provider, Historical   albuterol (PROVENTIL/VENTOLIN HFA) 90 mcg/actuation inhaler Inhale 1 puff into the lungs every 4 (four) hours as needed for Wheezing. 8/26/24   Jeronimo Bird MD   amLODIPine (NORVASC) 10 MG tablet Take 10 mg by mouth once daily. 2/24/24   Provider, Historical   atorvastatin (LIPITOR) 40 MG tablet Take 2 tablets (80 mg total) by mouth once daily. 7/26/24 7/26/25  Jeronimo Bird MD   carvediloL (COREG) 3.125 MG tablet Take 1 tablet (3.125 mg total) by mouth 2 (two) times daily. 2/15/24 2/14/25  Jeronimo Bird MD   cetirizine (ZYRTEC) 10 MG tablet Take 1 tablet (10 mg total) by mouth once daily. 2/15/24 5/30/24  Jeronimo Bird MD   cloNIDine (CATAPRES) 0.2 MG tablet Take 1 tablet (0.2 mg total) by mouth 2 (two) times daily. Takes 2 times per day. Takes whole tablet 2/15/24 2/14/25  Jeronimo Bird MD   diltiaZEM (CARDIZEM CD) 180 MG 24 hr capsule Take 1 capsule (180 mg total) by mouth every morning. 5/6/24 5/6/25  Jeronimo Bird MD   DULoxetine (CYMBALTA) 60 MG capsule Take 1 capsule (60 mg total) by mouth once daily. TAKE 1 CAPSULE BY MOUTH DAILY. DO NOT CRUSH OR CHEW 6/27/24 9/25/24  Jeronimo Bird MD   empagliflozin (JARDIANCE) 25 mg tablet Take 1 tablet (25 mg total) by mouth once daily. 2/15/24 2/14/25  Jeronimo Bird MD   exenatide microspheres (BYDUREON BCISE) 2 mg/0.85 mL AtIn Inject 2 mg into the skin every 7 days. 8/16/23   Vasile  Estrella ADLER MD   flash glucose scanning reader (FREESTYLE SMITHA 2 READER) Misc 1 each by Misc.(Non-Drug; Combo Route) route 4 (four) times daily with meals and nightly.  Patient not taking: Reported on 12/20/2023 9/27/23   Jeronimo Bird MD   flash glucose sensor (FREESTYLE SMITHA 2 SENSOR) Kit 1 each by Misc.(Non-Drug; Combo Route) route 2 hours after meals and at bedtime.  Patient not taking: Reported on 11/21/2023 9/27/23   Jeronimo Bird MD   furosemide (LASIX) 20 MG tablet TAKE ONE TABLET BY MOUTH DAILY AT 9 AM (VIAL) 7/15/24   Miranda Viveros FNP   gabapentin (NEURONTIN) 600 MG tablet Take 600 mg by mouth every morning. 5/3/24   Provider, Historical   hydrALAZINE (APRESOLINE) 50 MG tablet Take 1 tablet (50 mg total) by mouth every 8 (eight) hours. 11/19/23 11/18/24  Clarence Mancera MD   insulin aspart U-100 (NOVOLOG FLEXPEN U-100 INSULIN) 100 unit/mL (3 mL) InPn pen Inject 20 Units into the skin 3 (three) times daily with meals. 9/27/23   Jeronimo Bird MD   insulin glargine U-100, Lantus, (LANTUS SOLOSTAR U-100 INSULIN) 100 unit/mL (3 mL) InPn pen Inject 65 Units into the skin every evening. 6/26/24 6/26/25  Jeronimo Bird MD   isosorbide mononitrate (IMDUR) 30 MG 24 hr tablet Take 1 tablet (30 mg total) by mouth once daily. 5/30/24 9/27/24  Pillo Ayala,    ketorolac 0.5% (ACULAR) 0.5 % Drop Place 1 drop into the left eye 2 (two) times daily. 12/15/23   Provider, Historical   latanoprost 0.005 % ophthalmic solution Place 1 drop into both eyes once daily. 6/5/23   Juan Jose Urena,    losartan-hydrochlorothiazide 50-12.5 mg (HYZAAR) 50-12.5 mg per tablet Take 1 tablet by mouth every morning. 2/15/24 2/14/25  Jeronimo Bird MD   melatonin (MELATIN) 5 mg Take 1 tablet (5 mg total) by mouth nightly as needed for Insomnia. 8/26/24 8/26/25  Jeronimo iBrd MD   nebulizer accessories Kit 1 each by Misc.(Non-Drug; Combo Route) route 4 (four) times daily as needed (wheezing). 9/27/23   Jeronimo Bird MD  "  nitroGLYCERIN (NITROSTAT) 0.4 MG SL tablet Place 1 tablet (0.4 mg total) under the tongue every 5 (five) minutes as needed for Chest pain. 2/15/24 2/14/25  Jeronimo Bird MD   ofloxacin (OCUFLOX) 0.3 % ophthalmic solution Place 1 drop into the right eye 4 (four) times daily. 11/1/23   Provider, Historical   pen needle, diabetic 32 gauge x 5/32" Ndle 1 each by Misc.(Non-Drug; Combo Route) route 4 (four) times daily. 9/27/23 9/26/24  Jeronimo Bird MD   potassium chloride (K-TAB) 20 mEq Take 20 mEq by mouth every morning. 11/30/23   Provider, Historical   prednisoLONE acetate (PRED FORTE) 1 % DrpS Place 1 drop into the right eye 5 (five) times daily. 11/1/23   Provider, Historical   sodium chloride 5% (NIMA 128) 5 % ophthalmic solution Place 1 drop into both eyes as needed. 12/15/23   Provider, Historical   vitamin D (VITAMIN D3) 1000 units Tab Take 2 tablets (2,000 Units total) by mouth Daily. 2/15/24 2/14/25  Jeronimo Bird MD   warfarin (COUMADIN) 5 MG tablet Take 0.5 tablets (2.5 mg total) by mouth Daily. 7/3/24 7/3/25  Jeronimo Bird MD   albuterol (PROVENTIL/VENTOLIN HFA) 90 mcg/actuation inhaler Inhale 1 puff into the lungs every 4 (four) hours as needed for Wheezing. 6/30/24 8/26/24  Jeronimo Bird MD   melatonin (MELATIN) 5 mg Take 1 tablet (5 mg total) by mouth nightly as needed for Insomnia. 8/5/24 8/26/24  Jeronimo Bird MD     Review of Systems:  Review of Systems   Constitutional:  Negative for chills, diaphoresis, fatigue and fever.   HENT:  Negative for ear pain, hearing loss, rhinorrhea, sore throat, tinnitus and trouble swallowing.    Eyes:  Negative for pain, redness and visual disturbance.   Respiratory:  Negative for cough, chest tightness and shortness of breath.    Cardiovascular:  Negative for chest pain and palpitations.   Gastrointestinal:  Negative for abdominal pain, constipation, diarrhea, nausea and vomiting.   Genitourinary:  Negative for difficulty urinating, dysuria, frequency, " hematuria and urgency.   Musculoskeletal:  Negative for arthralgias and myalgias.   Skin:  Negative for rash and wound.   Neurological:  Negative for dizziness, seizures, syncope, weakness, light-headedness, numbness and headaches.   Psychiatric/Behavioral:  Negative for confusion and sleep disturbance.        Objective     Vitals:   Vitals:    03/11/25 1506   BP: 123/73   Pulse: 93   Resp: 20   Temp: 98.8 °F (37.1 °C)         Physical Examination:   Physical Exam  Vitals reviewed.   Constitutional:       General: She is not in acute distress.     Appearance: Normal appearance. She is obese. She is not ill-appearing, toxic-appearing or diaphoretic.   HENT:      Head: Normocephalic and atraumatic.      Right Ear: External ear normal.      Left Ear: External ear normal.   Eyes:      General: No scleral icterus.        Right eye: No discharge.         Left eye: No discharge.      Extraocular Movements: Extraocular movements intact.      Conjunctiva/sclera: Conjunctivae normal.      Pupils: Pupils are equal, round, and reactive to light.   Cardiovascular:      Rate and Rhythm: Normal rate and regular rhythm.      Pulses: Normal pulses.      Heart sounds: Normal heart sounds. No murmur heard.     No friction rub. No gallop.   Pulmonary:      Effort: Pulmonary effort is normal. No respiratory distress.      Breath sounds: Normal breath sounds. No wheezing, rhonchi or rales.   Abdominal:      General: Abdomen is flat. Bowel sounds are normal. There is no distension.      Palpations: Abdomen is soft.      Tenderness: There is no abdominal tenderness. There is no guarding.   Musculoskeletal:         General: No swelling, tenderness, deformity or signs of injury. Normal range of motion.      Right lower leg: No edema.      Left lower leg: No edema.   Skin:     General: Skin is warm and dry.      Capillary Refill: Capillary refill takes less than 2 seconds.      Coloration: Skin is not jaundiced.      Findings: No bruising,  erythema or rash.   Neurological:      Mental Status: She is alert.      Comments: AAO to person, place, time, and situation; CN II-XII grossly intact         PREVENTIVE CARE:  Lab Work:    DM (age>45 or HTN):  Lab Results   Component Value Date    HGBA1C 7.6 (H) 11/16/2023    WTGGQLK6P 8.5 02/11/2025    MICALBCREAT 48.6 (H) 10/05/2023     Lipid :  Lab Results   Component Value Date    CHOL 113 08/29/2024    TRIG 99 08/29/2024    HDL 46 08/29/2024    LDL 47.00 (L) 08/29/2024     Thyroid:  Lab Results   Component Value Date    TSH 1.976 08/29/2024     Screening:    Mammo: Mammogram (09/16/2024) showed areas of fibroglandular initial negative for suspicious mass, asymmetry, distortion, or calcifications  DEXA (age>65 or FRAX > 9.3%):  DXA (01/17/2023)  showed normal bone density to lumbar spine and bilateral femurs  Lung Ca (30PY smoker age 55-79 or quit in last 15y):  Not indicated due to never smoker   Colon Ca: (age 45-75-annual FOBT, 5y flex + FOBTq3 or 10y c-scope):  Colonoscopy (04/28/2023) normal; will repeat in 2028  AAA (smoker 65-76):  Not indicated due to patient sex    ID Titers and Vaccinations:    Immunization History   Administered Date(s) Administered    COVID-19, MRNA, LN-S, PF (Pfizer) (Purple Cap) 02/10/2021, 03/03/2021, 12/17/2021    Influenza 10/13/2010, 11/17/2017    Influenza (FLUAD) - Quadrivalent - Adjuvanted - PF *Preferred* (65+) 09/27/2023    Influenza - Quadrivalent 10/03/2016, 12/10/2019, 12/07/2020, 10/27/2021    Influenza - Quadrivalent - PF (6-35 months) 12/11/2018    Influenza - Quadrivalent - PF *Preferred* (6 months and older) 12/07/2020    Influenza - Trivalent - Fluad - Adjuvanted - PF (65 years and older 02/11/2025    Influenza - Trivalent - Fluarix, Flulaval, Fluzone, Afluria - PF 10/13/2010    Pneumococcal Conjugate - 13 Valent 12/07/2020    Pneumococcal Conjugate - 20 Valent 06/06/2023    Pneumococcal Polysaccharide - 23 Valent 04/04/2017    Tdap 12/11/2018    Zoster  "Recombinant 12/11/2018, 05/29/2019      HIV (once age 15-65):  No results found for: "HIV1X2", "ERR22KFQY"     Hepatitis Screening:   Lab Results   Component Value Date    HEPCAB Nonreactive 06/06/2023      Pneumococcal vaccine (65 and over or high risk): UTD  Influenza Vaccine: UTD  Tetanus: UTD  Zoster vaccination (> 50y.o): UTD  Covid vaccine: UTD    ASSESSMENT/PLAN:    Obesity  Diabetes mellitus type 2  Diabetic neuropathy  -last POCT A1c 8.5  -discussed importance of adhering to diabetic diet and good exercise  -referred to ophthalmology for diabetic eye exam  -diabetic foot exam showed palpable dorsalis pedis pulses, normal sensation to bilateral feet, in no evidence of skin breakdown/ulceration/infection  -continue levemir to 60 units qhs, novoLog 20 units tidwm, and Jardiance 25 mg q.d.  -will attempt to initiate tirzepatide 2.5 mg qweekly given patient has failed exenatide therapy given weight gain and A1c remaining above goal despite maximal therapy  -instructed patient to start tirzepatide and stop exenatide if insurance approves otherwise continue exenatide if unable to get tirzepatide  -patient reports she was told not to take take metformin due hx of colon and renal cancer  -will hold on prescribing sulfonylureas due to patient reporting multiple episodes of hypoglycemia  -continue duloxetine 60 mg qd for diabetic neuropathy  -referred to [facility] diabetes education for cgm education and assistance downloading cgm data    Hypertension  /72  -educated on low sodium (< 2g//day) DASH diet and exercise  -continue carvedilol 3.125 bid, diltiazem 180 mg qd, and losartan-hydrochlorothiazide 50-12.5 qd     Hyperlipidemia  CAD  Ischemic heart disease  Stable angina  Heart failure with reduced ejection fractions with grade I diastolic dysfunction  The ASCVD Risk score (Charanjit HAIRSTON, et al., 2019) failed to calculate for the following reasons:    Risk score cannot be calculated because patient has a medical history " suggesting prior/existing ASCVD  -nuclear stress test (04/2021) showed anterolateral and inferior ischemia  -nuclear stress test (01/09/2024) showed no evidence of myocardial ischemia or infarction  -LHC (09/2021) showed no evidence of coronary artery disease  -TTE (04/2021) showed EF 50-55%  -TTE (12/2023) showed EF 45% with grade I diastolic dysfunction  -patient with mildly decreased ejection compared to prior TTE  -likely related to new onset atrial fibrillation but given patient experiencing episodes of angina cannot definitively rule out ischemic heart disease despite nuclear stress showing no evidence of ischemia  -patient followed with cardiology 01/28/25 - per them, continue imdur 30 and nitroglycerin prn; plan to repeat echo before next visit  -continue atorvastatin 80 mg qd, carvedilol 3.125 bid, and losartan-hydrochlorothiazide 50-12.5 qd    Hemorrhagic stroke (12/2023) without sustained post stroke neurological deficits  -diagnosed (12/2023) at [facility]   -no residual neuro deficits  -have attempted multiple referrals for neurosurgery follow up but most referrals have been denied  -had finally gotten follow up established at [facility] however patient unable to attending due to Medicaid transportation not showing    Persistent atrial fibrillation  -rate controlled with carvedilol and diltiazem  -continue carvedilol 3.125 mg qd and diltiazem 180 mg qd    Chronic kidney disease stage IIIb  -continues to follow with nephrology  -additional workup including SPEP, UPEP, NOA, ANCA, and retroperitoneal US ordered along with usual blood work   -NOA +, elevated urine protein, IgG levels, free light chains along with abnormal SPEP without M spike.   -Based on results, KANDY panel ordered to determine cause of elevated NOA; they will consider rheumatology referral once results return.  -continue to avoid excessive use of NSAIDs (ibuprofen, naproxen, Aleve, Advil, Toradol, Mobic) and nephrotoxic  drugs  -continue to optimize hypertension and diabetes control  -continue management per nephrology    Renal cell carcinoma status post nephrectomy (2011)   Adenocarcinoma of the colon stage IIA status post hemicolectomy (2014) and adjuvant chemotherapy (2015)  Hx of provoked PE presumed 2/2 malignancy  -patient is s/p hemicolectomy, nephrectomy, and chemotherapy  -completed chemo in April 2015  -seen by Oncology previously but discharged from clinic  -continues on coumadin     Hematochezia  Internal hemorrhoids  -patient reports painless bright red blood per rectum of about a quarter size amount x 2 weeks  -no constipation, diarrhea, or straining with defecation  -no other alarm signs  -considering hematochezia 2/2 internal hemorrhoids noted on last colonoscopy at this time  -will continue to monitor    MOHSEN on CPAP  -continue CPAP    Multinodular goiter  TSH 1.976 (08/29/24)  Free T4 0.95  -patient asymptotic  -thyroid biopsy results showed benign thyroid nodules  -will continue to monitor    Depression  -symptomatically controlled  -no AVH/SI/HI   -continue duloxetine 60 mg qd    Gout  -reports no recent flares   -continue allopurinol    Health Maintenance:  -lab work UTD  -initiated and/or refilled medications as above  -screenings ordered and/or UTD as above  -vaccinations UTD    Future Appointments   Date Time Provider Department Center   3/12/2025  2:00 PM Dara Grullon RD [facility] FMDEDU [place]   3/24/2025  1:40 PM PROVIDERS, BERNARDA OPHTH USJALIL OPHJOANIE [place]   3/24/2025  3:00 PM COUMADIN, OLGH BRACC OLGHB COUM BRACC   5/29/2025  3:30 PM Wu Taylor MD [facility] CARD [place]   6/27/2025 11:00 AM Silvia Mckeon MD [facility] NEPHR [place]   8/28/2025  1:50 PM Jeronimo Bird MD [facility] IM RES [place]     Jeronimo Bird MD  [facility] Internal Medicine, HO-II

## 2025-08-26 ENCOUNTER — APPOINTMENT (OUTPATIENT)
Dept: CARDIOLOGY | Facility: CLINIC | Age: 70
End: 2025-08-26